# Patient Record
Sex: MALE | Race: WHITE | NOT HISPANIC OR LATINO | Employment: OTHER | URBAN - METROPOLITAN AREA
[De-identification: names, ages, dates, MRNs, and addresses within clinical notes are randomized per-mention and may not be internally consistent; named-entity substitution may affect disease eponyms.]

---

## 2024-01-23 PROBLEM — F32.A DEPRESSION: Status: ACTIVE | Noted: 2024-01-23

## 2024-01-23 PROBLEM — E78.9 ABNORMAL CHOLESTEROL TEST: Status: ACTIVE | Noted: 2024-01-23

## 2024-01-23 PROBLEM — G20.A2 PARKINSON'S DISEASE WITHOUT DYSKINESIA, WITH FLUCTUATING MANIFESTATIONS (MULTI): Status: ACTIVE | Noted: 2024-01-23

## 2024-01-23 PROBLEM — I10 HYPERTENSION: Status: ACTIVE | Noted: 2024-01-23

## 2024-01-23 PROBLEM — R55 SYNCOPE: Status: ACTIVE | Noted: 2024-01-23

## 2024-01-23 RX ORDER — ACETAMINOPHEN 500 MG
1 TABLET ORAL DAILY
COMMUNITY
Start: 2020-04-26 | End: 2024-01-24 | Stop reason: WASHOUT

## 2024-01-23 RX ORDER — POLYETHYLENE GLYCOL 3350 17 G/17G
17 POWDER, FOR SOLUTION ORAL 2 TIMES DAILY
COMMUNITY
Start: 2015-08-27 | End: 2024-01-24 | Stop reason: WASHOUT

## 2024-01-23 RX ORDER — LOSARTAN POTASSIUM AND HYDROCHLOROTHIAZIDE 12.5; 5 MG/1; MG/1
1 TABLET ORAL DAILY
COMMUNITY
Start: 2021-01-19 | End: 2024-01-24 | Stop reason: SINTOL

## 2024-01-23 RX ORDER — FLUOXETINE HYDROCHLORIDE 20 MG/1
1 CAPSULE ORAL
COMMUNITY
Start: 2021-08-24 | End: 2024-01-24 | Stop reason: WASHOUT

## 2024-01-23 RX ORDER — FOLIC ACID 1 MG/1
1 TABLET ORAL
COMMUNITY
Start: 2015-08-27 | End: 2024-01-24 | Stop reason: WASHOUT

## 2024-01-23 RX ORDER — LANOLIN ALCOHOL/MO/W.PET/CERES
100 CREAM (GRAM) TOPICAL
COMMUNITY
Start: 2015-08-27 | End: 2024-01-24 | Stop reason: ALTCHOICE

## 2024-01-23 NOTE — PROGRESS NOTES
Subjective   Reason for Visit: Hakan Mccurdy is an 73 y.o. male here for a Medicare Wellness visit.               HPI  The patient presented in the office today to reestablish me as his PCP.  He returned from [facility] 3 months ago.  While there, he was diagnosed with Parkinson's disease.  Over the past 2 years, he has noted continued chronic poor balance, constant shuffling gait.  Also, he reports a history of a constant tremor in the right hand x 2 years.  He reports that the amplitude of the tremor increases when he is under stress.  He still reports continued chronic kinetic and mild postural tremor in the left hand-not significantly changed over the past 2 years.    He does report a history of intermittent episodes of choking times a few years.  He reports that the episodes only occur when he tries to swallow larger pieces of food while eating quickly.  He reports no other associated symptoms.    Over the past 2 years, the patient reports nocturia x 7-8, increased urinary frequency, urinary urgency, weaker urine stream, more frequent interruption of urine stream, and increase in postvoid dribbling.  He reports no other associated symptoms.  Since discontinuing losartan over a year ago, he reports no episodes of stiffness throughout the fingers of both hands    He also discontinued rosuvastatin a few years ago secondary to the development of constant diffuse arthralgias and myalgias.  Also, he has not been using carbidopa levodopa because he was experiencing freezing episodes every few hours while on the medication regimen.  He reports no other new complaints.  Patient Care Team:  Richard Wong MD as PCP - General (Internal Medicine)     Review of Systems  All systems have been reviewed and are normal except as previously noted  Objective   Vitals:  There were no vitals taken for this visit.      Physical Exam  Head-palpation revealed no tenderness over the maxillary or frontal sinuses  Eyes-extraocular  movements intact pupils -unequal-left pupil dilated; fundi revealed good retinal color, no hemorrhages or exudates  Ears-palpation of pinnas and traguses revealed no tenderness. External auditory canals not erythematous or swollen.  Left TM clear; right TM not visualized secondary to impacted cerumen  Nose-turbinates not erythematous or swollen no septal deviation noted  Mouth-posterior pharynx is not erythematous or swollen. Tonsillar pillars appeared normal no exudates  Neck no lymphadenopathy. Thyroid gland not enlarged. , No bruits  Lungs-clear to auscultation bilaterally  Cardiac-rate normal rhythm regular no murmurs no JVD  Abdomen-soft, slightly distended, normal active bowel sounds. Palpation revealed no tenderness or masses  Pulses 2+ bilaterally  Extremities-no peripheral edema      Skin-Diffuse xerosis noted-especially over the lower legs bilaterally.  Multiple cherry red spots noted on the back. There is a 2 x 1 cm brown scaly plaque located in the right upper back region.    Neurologic  Mental status-alert and oriented x3   Cranial nerves--2 through 12 grossly intact,; masked facies noted no visual field abnormalities  Motor-no pronator drift noted, strength-5/5 in all muscle groups tested, , resting tremor noted right upper extremity, mild kinetic and postural tremor noted left upper extremity.  Mild bradykinesia noted.  Rigidity noted in all 4 extremities lower extremities greater than upper extremities.  Positive pull test  Sensory-Light touch sensation fully intact  Pinprick sensation fully intact  Vibratory sensation diminished in the lower extremities bilaterally right greater than left and in the upper extremities bilaterally right greater than left  Cerebellar-no truncal ataxia, good coordination finger-nose testing,, good coordination heel-to-shin testing, normal rapid alternating movements  Positive Romberg, patient unable to perform tandem gait  Reflexes-upper extremities 2+/4 bilaterally,  lower extremities 0/4 bilaterally  Assessment/Plan   Problem List Items Addressed This Visit    None       Assessment  Left anterior hemiblock  Hypertension-blood pressure at goal today  Asthma  COVID-19 summer 2022  Chronic intermittent episodes of nasal congestion, rhinorrhea, postnasal drip-likely secondary to vasomotor rhinitis. Allergic rhinitis may be a contributing factor  Multiple food allergies  Chronic frequency, chronic nocturiax6-7,, chronic weak stream, chronic hesitancy, chronic intermittentinterruption of stream, chronic post void dribbling-likely secondary to BPH  Colonic polyps  Diverticulosis  Vitamin D deficiency  Syncopal episode June 2016-etiology not elicited  Chronic poor balance-secondary to Parkinson's disease  Chronic  shuffling gait-may be secondary to Parkinson's disease.  Chronic resting tremor right hand-likely secondary to Parkinson's disease  Chronic mild postural and kinetic tremor left hand-May be secondary to essential tremor  Small vessel ischemic changes  Parkinson's disease  Depression-status post ECT treatment 2015  Anxiety  Obsessive-compulsive disorder  Personality disorder    Plan  Obtain urinalysis, CBC differential, CMP, fasting lipid profile, TSH, vitamin D level, vitamin B12 level, cardiac CRP today.  Refer to new neurologist in [facility]  I told the patient to begin application of Lubriderm over the entire body twice daily  More than likely, I will be starting the patient on Flomax 0.4 mg p.o. every evening  The patient will return for a follow-up visit in 6 months

## 2024-01-24 ENCOUNTER — LAB (OUTPATIENT)
Dept: LAB | Facility: LAB | Age: 74
End: 2024-01-24
Payer: MEDICARE

## 2024-01-24 ENCOUNTER — OFFICE VISIT (OUTPATIENT)
Dept: PRIMARY CARE | Facility: CLINIC | Age: 74
End: 2024-01-24
Payer: MEDICARE

## 2024-01-24 ENCOUNTER — TELEPHONE (OUTPATIENT)
Dept: PRIMARY CARE | Facility: CLINIC | Age: 74
End: 2024-01-24

## 2024-01-24 VITALS
DIASTOLIC BLOOD PRESSURE: 70 MMHG | SYSTOLIC BLOOD PRESSURE: 100 MMHG | HEART RATE: 100 BPM | BODY MASS INDEX: 29.58 KG/M2 | WEIGHT: 183.2 LBS

## 2024-01-24 DIAGNOSIS — G20.A2 PARKINSON'S DISEASE WITHOUT DYSKINESIA, WITH FLUCTUATING MANIFESTATIONS (MULTI): ICD-10-CM

## 2024-01-24 DIAGNOSIS — F41.9 ANXIETY DISORDER, UNSPECIFIED TYPE: ICD-10-CM

## 2024-01-24 DIAGNOSIS — Z00.00 ROUTINE GENERAL MEDICAL EXAMINATION AT A HEALTH CARE FACILITY: ICD-10-CM

## 2024-01-24 DIAGNOSIS — E78.9 ABNORMAL CHOLESTEROL TEST: ICD-10-CM

## 2024-01-24 DIAGNOSIS — N40.1 BENIGN LOCALIZED PROSTATIC HYPERPLASIA WITH LOWER URINARY TRACT SYMPTOMS (LUTS): ICD-10-CM

## 2024-01-24 DIAGNOSIS — R80.9 PROTEINURIA, UNSPECIFIED TYPE: ICD-10-CM

## 2024-01-24 DIAGNOSIS — E55.9 VITAMIN D DEFICIENCY: ICD-10-CM

## 2024-01-24 DIAGNOSIS — G20.A2 PARKINSON'S DISEASE WITHOUT DYSKINESIA, WITH FLUCTUATING MANIFESTATIONS (MULTI): Primary | ICD-10-CM

## 2024-01-24 DIAGNOSIS — N40.0 ENLARGED PROSTATE: ICD-10-CM

## 2024-01-24 LAB
25(OH)D3 SERPL-MCNC: 62 NG/ML (ref 30–100)
ALBUMIN SERPL BCP-MCNC: 4.1 G/DL (ref 3.4–5)
ALP SERPL-CCNC: 83 U/L (ref 33–136)
ALT SERPL W P-5'-P-CCNC: 14 U/L (ref 10–52)
ANION GAP SERPL CALC-SCNC: 11 MMOL/L (ref 10–20)
AST SERPL W P-5'-P-CCNC: 21 U/L (ref 9–39)
BASOPHILS # BLD AUTO: 0.04 X10*3/UL (ref 0–0.1)
BASOPHILS NFR BLD AUTO: 0.7 %
BILIRUB SERPL-MCNC: 0.7 MG/DL (ref 0–1.2)
BUN SERPL-MCNC: 21 MG/DL (ref 6–23)
CALCIUM SERPL-MCNC: 9.2 MG/DL (ref 8.6–10.6)
CHLORIDE SERPL-SCNC: 103 MMOL/L (ref 98–107)
CHOLEST SERPL-MCNC: 187 MG/DL (ref 0–199)
CHOLESTEROL/HDL RATIO: 3.5
CO2 SERPL-SCNC: 30 MMOL/L (ref 21–32)
CREAT SERPL-MCNC: 1.1 MG/DL (ref 0.5–1.3)
EGFRCR SERPLBLD CKD-EPI 2021: 71 ML/MIN/1.73M*2
EOSINOPHIL # BLD AUTO: 0.04 X10*3/UL (ref 0–0.4)
EOSINOPHIL NFR BLD AUTO: 0.7 %
ERYTHROCYTE [DISTWIDTH] IN BLOOD BY AUTOMATED COUNT: 12.3 % (ref 11.5–14.5)
GLUCOSE SERPL-MCNC: 96 MG/DL (ref 74–99)
HCT VFR BLD AUTO: 48 % (ref 41–52)
HDLC SERPL-MCNC: 53.2 MG/DL
HGB BLD-MCNC: 16.2 G/DL (ref 13.5–17.5)
IMM GRANULOCYTES # BLD AUTO: 0.03 X10*3/UL (ref 0–0.5)
IMM GRANULOCYTES NFR BLD AUTO: 0.5 % (ref 0–0.9)
LDLC SERPL CALC-MCNC: 117 MG/DL
LYMPHOCYTES # BLD AUTO: 1.01 X10*3/UL (ref 0.8–3)
LYMPHOCYTES NFR BLD AUTO: 16.4 %
MCH RBC QN AUTO: 32.5 PG (ref 26–34)
MCHC RBC AUTO-ENTMCNC: 33.8 G/DL (ref 32–36)
MCV RBC AUTO: 96 FL (ref 80–100)
MONOCYTES # BLD AUTO: 0.66 X10*3/UL (ref 0.05–0.8)
MONOCYTES NFR BLD AUTO: 10.7 %
NEUTROPHILS # BLD AUTO: 4.36 X10*3/UL (ref 1.6–5.5)
NEUTROPHILS NFR BLD AUTO: 71 %
NON HDL CHOLESTEROL: 134 MG/DL (ref 0–149)
NRBC BLD-RTO: 0 /100 WBCS (ref 0–0)
PLATELET # BLD AUTO: 213 X10*3/UL (ref 150–450)
POC APPEARANCE, URINE: CLEAR
POC BILIRUBIN, URINE: NEGATIVE
POC BLOOD, URINE: NEGATIVE
POC COLOR, URINE: YELLOW
POC GLUCOSE, URINE: NEGATIVE MG/DL
POC KETONES, URINE: ABNORMAL MG/DL
POC LEUKOCYTES, URINE: NEGATIVE
POC NITRITE,URINE: NEGATIVE
POC PH, URINE: 6 PH
POC PROTEIN, URINE: ABNORMAL MG/DL
POC SPECIFIC GRAVITY, URINE: 1.02
POC UROBILINOGEN, URINE: 1 EU/DL
POTASSIUM SERPL-SCNC: 4.1 MMOL/L (ref 3.5–5.3)
PROT SERPL-MCNC: 6.4 G/DL (ref 6.4–8.2)
PSA SERPL-MCNC: 1.73 NG/ML
RBC # BLD AUTO: 4.99 X10*6/UL (ref 4.5–5.9)
SODIUM SERPL-SCNC: 140 MMOL/L (ref 136–145)
TRIGL SERPL-MCNC: 83 MG/DL (ref 0–149)
TSH SERPL-ACNC: 1.18 MIU/L (ref 0.44–3.98)
VIT B12 SERPL-MCNC: 277 PG/ML (ref 211–911)
VLDL: 17 MG/DL (ref 0–40)
WBC # BLD AUTO: 6.1 X10*3/UL (ref 4.4–11.3)

## 2024-01-24 PROCEDURE — 99213 OFFICE O/P EST LOW 20 MIN: CPT | Performed by: INTERNAL MEDICINE

## 2024-01-24 PROCEDURE — G0439 PPPS, SUBSEQ VISIT: HCPCS | Performed by: INTERNAL MEDICINE

## 2024-01-24 PROCEDURE — 1170F FXNL STATUS ASSESSED: CPT | Performed by: INTERNAL MEDICINE

## 2024-01-24 PROCEDURE — 84156 ASSAY OF PROTEIN URINE: CPT

## 2024-01-24 PROCEDURE — 3074F SYST BP LT 130 MM HG: CPT | Performed by: INTERNAL MEDICINE

## 2024-01-24 PROCEDURE — 80053 COMPREHEN METABOLIC PANEL: CPT

## 2024-01-24 PROCEDURE — 82607 VITAMIN B-12: CPT

## 2024-01-24 PROCEDURE — 36415 COLL VENOUS BLD VENIPUNCTURE: CPT

## 2024-01-24 PROCEDURE — 84443 ASSAY THYROID STIM HORMONE: CPT

## 2024-01-24 PROCEDURE — 82306 VITAMIN D 25 HYDROXY: CPT

## 2024-01-24 PROCEDURE — 3078F DIAST BP <80 MM HG: CPT | Performed by: INTERNAL MEDICINE

## 2024-01-24 PROCEDURE — 80061 LIPID PANEL: CPT

## 2024-01-24 PROCEDURE — 85025 COMPLETE CBC W/AUTO DIFF WBC: CPT

## 2024-01-24 PROCEDURE — 84153 ASSAY OF PSA TOTAL: CPT

## 2024-01-24 PROCEDURE — 81002 URINALYSIS NONAUTO W/O SCOPE: CPT | Performed by: INTERNAL MEDICINE

## 2024-01-24 PROCEDURE — 82570 ASSAY OF URINE CREATININE: CPT

## 2024-01-24 PROCEDURE — 1157F ADVNC CARE PLAN IN RCRD: CPT | Performed by: INTERNAL MEDICINE

## 2024-01-24 RX ORDER — CHOLECALCIFEROL (VITAMIN D3) 50 MCG
50 TABLET ORAL DAILY
COMMUNITY

## 2024-01-24 ASSESSMENT — ENCOUNTER SYMPTOMS
DEPRESSION: 0
LOSS OF SENSATION IN FEET: 0
OCCASIONAL FEELINGS OF UNSTEADINESS: 1

## 2024-01-24 ASSESSMENT — ACTIVITIES OF DAILY LIVING (ADL)
MANAGING_FINANCES: INDEPENDENT
DOING_HOUSEWORK: INDEPENDENT
TAKING_MEDICATION: INDEPENDENT
BATHING: INDEPENDENT
GROCERY_SHOPPING: INDEPENDENT
DRESSING: INDEPENDENT

## 2024-01-24 NOTE — TELEPHONE ENCOUNTER
He was supposed to give you the name of Dr. Blayne Jimenez and you were going to give him his phone number

## 2024-01-25 LAB
CREAT UR-MCNC: 241.9 MG/DL (ref 20–370)
PROT UR-ACNC: 53 MG/DL (ref 5–25)
PROT/CREAT UR: 0.22 MG/MG CREAT (ref 0–0.17)

## 2024-01-26 LAB — CREAT UR-MCNC: 247.5 MG/DL (ref 20–370)

## 2024-01-31 NOTE — PROGRESS NOTES
Subjective   Patient ID: Hakan Mccurdy is a 73 y.o. male who presents for follow-up visit    HPI   The patient presented to the office today for discussion of lab work.  The patient's 10-year ASCVD risk is 16.2%.  As a result, I recommended that he undergo a CT calcium score test which I have ordered.  He will have this test done in the near future.  The rest of the patient's lab work was excellent.  I recommended that he schedule a follow-up visit with me in late July 2024  Review of Systems    Objective   There were no vitals taken for this visit.    Physical Exam    Assessment/Plan

## 2024-02-01 ENCOUNTER — OFFICE VISIT (OUTPATIENT)
Dept: PRIMARY CARE | Facility: CLINIC | Age: 74
End: 2024-02-01
Payer: MEDICARE

## 2024-02-01 VITALS — WEIGHT: 181 LBS | BODY MASS INDEX: 29.23 KG/M2

## 2024-02-01 DIAGNOSIS — I10 PRIMARY HYPERTENSION: Primary | ICD-10-CM

## 2024-02-01 DIAGNOSIS — E78.9 ABNORMAL CHOLESTEROL TEST: ICD-10-CM

## 2024-02-01 PROCEDURE — 1157F ADVNC CARE PLAN IN RCRD: CPT | Performed by: INTERNAL MEDICINE

## 2024-02-01 PROCEDURE — 99212 OFFICE O/P EST SF 10 MIN: CPT | Performed by: INTERNAL MEDICINE

## 2024-02-01 PROCEDURE — 1160F RVW MEDS BY RX/DR IN RCRD: CPT | Performed by: INTERNAL MEDICINE

## 2024-02-01 PROCEDURE — 1159F MED LIST DOCD IN RCRD: CPT | Performed by: INTERNAL MEDICINE

## 2024-02-14 ENCOUNTER — APPOINTMENT (OUTPATIENT)
Dept: PRIMARY CARE | Facility: CLINIC | Age: 74
End: 2024-02-14
Payer: MEDICARE

## 2024-03-12 RX ORDER — BISACODYL 5 MG
TABLET, DELAYED RELEASE (ENTERIC COATED) ORAL
COMMUNITY
Start: 2021-02-23

## 2024-03-12 RX ORDER — FLUTICASONE PROPIONATE 50 MCG
SPRAY, SUSPENSION (ML) NASAL
COMMUNITY
Start: 2018-03-06

## 2024-03-12 RX ORDER — DOXYCYCLINE HYCLATE 100 MG
TABLET ORAL EVERY 12 HOURS
COMMUNITY
Start: 2018-03-06

## 2024-03-12 RX ORDER — FLUOXETINE HYDROCHLORIDE 20 MG/1
1 CAPSULE ORAL DAILY
COMMUNITY
Start: 2016-07-13

## 2024-03-12 RX ORDER — ROSUVASTATIN CALCIUM 5 MG/1
5 TABLET, COATED ORAL DAILY
COMMUNITY
Start: 2023-09-16

## 2024-03-12 RX ORDER — MULTIVITAMIN WITH IRON
TABLET ORAL
COMMUNITY
Start: 2015-09-27

## 2024-03-12 RX ORDER — LISINOPRIL AND HYDROCHLOROTHIAZIDE 10; 12.5 MG/1; MG/1
TABLET ORAL
COMMUNITY
Start: 2021-01-05

## 2024-03-12 RX ORDER — ALBUTEROL SULFATE 90 UG/1
AEROSOL, METERED RESPIRATORY (INHALATION)
COMMUNITY
Start: 2018-03-06

## 2024-03-12 RX ORDER — GUAIFENESIN AND DEXTROMETHORPHAN HYDROBROMIDE 1200; 60 MG/1; MG/1
TABLET, EXTENDED RELEASE ORAL
COMMUNITY
Start: 2018-03-06

## 2024-03-12 RX ORDER — CARBIDOPA AND LEVODOPA 25; 100 MG/1; MG/1
1 TABLET ORAL 3 TIMES DAILY
COMMUNITY
Start: 2023-09-20 | End: 2024-03-13

## 2024-03-13 ENCOUNTER — OFFICE VISIT (OUTPATIENT)
Dept: NEUROLOGY | Facility: CLINIC | Age: 74
End: 2024-03-13
Payer: MEDICARE

## 2024-03-13 VITALS
DIASTOLIC BLOOD PRESSURE: 91 MMHG | WEIGHT: 181 LBS | HEART RATE: 111 BPM | RESPIRATION RATE: 18 BRPM | BODY MASS INDEX: 29.23 KG/M2 | SYSTOLIC BLOOD PRESSURE: 126 MMHG

## 2024-03-13 DIAGNOSIS — Z13.6 ENCOUNTER FOR SCREENING FOR CARDIOVASCULAR DISORDERS: ICD-10-CM

## 2024-03-13 DIAGNOSIS — G20.A1 PARKINSON'S DISEASE WITHOUT DYSKINESIA OR FLUCTUATING MANIFESTATIONS (MULTI): Primary | ICD-10-CM

## 2024-03-13 DIAGNOSIS — R41.3 MEMORY LOSS: ICD-10-CM

## 2024-03-13 PROCEDURE — 3074F SYST BP LT 130 MM HG: CPT | Performed by: PSYCHIATRY & NEUROLOGY

## 2024-03-13 PROCEDURE — 99205 OFFICE O/P NEW HI 60 MIN: CPT | Performed by: PSYCHIATRY & NEUROLOGY

## 2024-03-13 PROCEDURE — 1159F MED LIST DOCD IN RCRD: CPT | Performed by: PSYCHIATRY & NEUROLOGY

## 2024-03-13 PROCEDURE — 1160F RVW MEDS BY RX/DR IN RCRD: CPT | Performed by: PSYCHIATRY & NEUROLOGY

## 2024-03-13 PROCEDURE — 1157F ADVNC CARE PLAN IN RCRD: CPT | Performed by: PSYCHIATRY & NEUROLOGY

## 2024-03-13 PROCEDURE — 3079F DIAST BP 80-89 MM HG: CPT | Performed by: PSYCHIATRY & NEUROLOGY

## 2024-03-13 PROCEDURE — 1036F TOBACCO NON-USER: CPT | Performed by: PSYCHIATRY & NEUROLOGY

## 2024-03-13 RX ORDER — RIVASTIGMINE TARTRATE 1.5 MG/1
1.5 CAPSULE ORAL 2 TIMES DAILY
Qty: 28 CAPSULE | Refills: 0 | Status: SHIPPED | OUTPATIENT
Start: 2024-03-13 | End: 2024-03-27

## 2024-03-13 RX ORDER — RIVASTIGMINE TARTRATE 3 MG/1
3 CAPSULE ORAL 2 TIMES DAILY
Qty: 60 CAPSULE | Refills: 6 | Status: SHIPPED | OUTPATIENT
Start: 2024-03-13 | End: 2025-03-13

## 2024-03-13 RX ORDER — LEVODOPA AND CARBIDOPA 95; 23.75 MG/1; MG/1
1 CAPSULE, EXTENDED RELEASE ORAL 3 TIMES DAILY
Qty: 90 CAPSULE | Refills: 5 | Status: SHIPPED | OUTPATIENT
Start: 2024-03-13

## 2024-03-13 ASSESSMENT — UNIFIED PARKINSONS DISEASE RATING SCALE (UPDRS)
RIGIDITY_RLE: 1
PRONATION_SUPINATION_LEFT: 2
TOTAL_SCORE: 47
FACIAL_EXPRESSION: 2
AMPLITUDE_LLE: 0
POSTURAL_TREMOR_LEFTHAND: 0
PRONATION_SUPINATION_RIGHT: 3
HANDMOVEMENTS_RIGHT: 3
POSTURAL_STABILITY: 2
CONSTANCY_TREMOR_ATREST: 2
AMPLITUDE_LUE: 0
LEG_AGILITY_RIGHT: 1
SPEECH: 2
RIGIDITY_RUE: 1
HOEHN_YAHR: 2
RIGIDITY_LLE: 1
RIGIDITY_LUE: 1
FINGER_TAPPING_LEFT: 2
AMPLITUDE_RUE: 3
KINETIC_TREMOR_RIGHTHAND: 0
POSTURE: 2
AMPLITUDE_LIP_JAW: 0
RIGIDITY_NECK: 2
FREEZING_GAIT: 1
KINETIC_TREMOR_LEFTHAND: 0
SPONTANEITY_OF_MOVEMENT: 2
AMPLITUDE_RLE: 0
DYSKINESIAS_PRESENT: NO
FINGER_TAPPING_RIGHT: 3
TOETAPPING_RIGHT: 3
GAIT: 2
LEG_AGILITY_LEFT: 1
POSTURAL_TREMOR_RIGHTHAND: 0
TOETAPPING_LEFT: 2
CHAIR_RISING_SCALE: 1

## 2024-03-13 ASSESSMENT — PATIENT HEALTH QUESTIONNAIRE - PHQ9
1. LITTLE INTEREST OR PLEASURE IN DOING THINGS: NOT AT ALL
SUM OF ALL RESPONSES TO PHQ9 QUESTIONS 1 AND 2: 0
2. FEELING DOWN, DEPRESSED OR HOPELESS: NOT AT ALL

## 2024-03-13 ASSESSMENT — ENCOUNTER SYMPTOMS
DEPRESSION: 0
LOSS OF SENSATION IN FEET: 0
OCCASIONAL FEELINGS OF UNSTEADINESS: 1

## 2024-03-13 NOTE — PATIENT INSTRUCTIONS
"It was a pleasure seeing you today for Parkinson's disease with peak dose fatigue.  Try Rytary.    Please make a follow up appointment at the  or by calling the office in 4 months.     For any urgent issues or questions call [phone], option for doctor's , during our office hours Monday-Friday 8am-4:30pm, and leave a voicemail with your concern.  My office will try to reach back you as soon as possible within 24 (business) hours.  If you have an emergency please call 911 or visit a local urgent care or nearest emergency room.      Please understand that [employer] is a useful communication tool for simple \"normal\" results or a refill request but I would not recommend using this tool for emergent or urgent issues.  I am happy to ask my staff to arrange a follow up visit or a virtual visit sooner than requested with myself or Jackson (Nurse Practitioner), if appropriate for your care.    "

## 2024-03-13 NOTE — LETTER
"March 13, 2024     Richard Wong MD  [address] [facility] [address]    Patient: Hakan Mccurdy   YOB: 1950   Date of Visit: 3/13/2024       Dear Dr. Richard Wong MD:    Thank you for referring Hakan Mccurdy to me for evaluation. Below are my notes for this consultation.  If you have questions, please do not hesitate to call me. I look forward to following your patient along with you.       Sincerely,     Blayne Jimenez MD      CC: No Recipients  ______________________________________________________________________________________    [facility]     Subjective     Hakan Mccurdy is a  73 y.o. year old male who presents with Parkinson's disease   Visit type: new patient visit     HPI    He was living in [place], [place] for 2 years and was diagnosed with Parkinson's disease.  He was seen by Dr. Baez General Neurologist [phone] fax [phone].   Carbidopa/levodopa 25/100 worked for an hour, then plateau for an hour, then \"crash\" for an hour and became fatigued when he \"crashed.\"  He would fall asleep at those time, abruptly.  carbidopa/levodopa 25/100 1 TID.  He denies any other side effects, and it did seem to help before he had to stop it.  He has not been on any medications since he stopped levodopa.    Dr. Baez did check an MRI of the brain, but we do not have a report.  He thinks it was unremarkable, but he does not recall for sure.    He reports some short-term memory problems for a number of months, but long-term memory is fine.  He denies any difficulty with driving.    He has rest tremor, poor balance for 3 years.  No cane.  No physical therapy.    He is in independent living.  They do not provide transportation, but they do provide meals.        Patient Health Questionnaire-2 Score: 0                  Onset of parkinsonism: 2-3 years ago          Diagnosis of Parkinson's disease: 2 years ago    MOTOR SYMPTOMS:  Balance difficulty:  yes  Falls:  " almost  Exercise/PT:  no  Help with ADLs:  yes    Atypical features: no     NON MOTOR SYMPTOMS  Orthostatic lightheadedness:  no  Depression: no  Anxiety: no  Insomnia: no only occasionally  Dysphagia: no  Constipation: no  REM sleep behavioral disorder: no  Memory loss: no     PMH: BPH, htn, diverticulosis      Patient Active Problem List   Diagnosis   • Abnormal cholesterol test   • Constipation   • Depression   • Diverticulosis   • Early satiety   • Enlarged prostate   • Hypertension   • Recurrent major depression in partial remission (CMS/HCC)   • Syncope   • Parkinson's disease without dyskinesia, with fluctuating manifestations      Past Medical History:   Diagnosis Date   • Personal history of other diseases of male genital organs 10/08/2021    H/O prostatic hyperplasia   • Personal history of other diseases of the respiratory system 10/08/2021    History of asthma      Past Surgical History:   Procedure Laterality Date   • MR HEAD ANGIO WO IV CONTRAST  6/20/2015    MR HEAD ANGIO WO IV CONTRAST 6/20/2015 [facility]ICAL [facility]GAC[facility]   • MR NECK ANGIO WO IV CONTRAST  6/20/2015    MR NECK ANGIO WO IV CONTRAST 6/20/2015 [facility] CLINICAL LEGACY      Social History     Socioeconomic History   • Marital status: Single     Spouse name: Not on file   • Number of children: Not on file   • Years of education: Not on file   • Highest education level: Not on file   Occupational History   • Not on file   Tobacco Use   • Smoking status: Never   • Smokeless tobacco: Never   Substance and Sexual Activity   • Alcohol use: Never   • Drug use: Not on file   • Sexual activity: Not on file   Other Topics Concern   • Not on file   Social History Narrative   • Not on file     Social Determinants of Health     Financial Resource Strain: Not on file   Food Insecurity: Not on file   Transportation Needs: Not on file   Physical Activity: Not on file   Stress: Not on file   Social Connections: Not on file   Intimate Partner Violence: Not on file    Housing Stability: Not on file      Family History   Problem Relation Name Age of Onset   • Hypertension Mother     • Other (Type 2 diabetes mellitus) Mother     • Other (Alzheimer's dementia) Mother     • Other (Peripheral neuropathy) Father     • Ulcerative colitis Sister        Patient Health Questionnaire-2 Score: 0      Visit Vitals  BP (!) 126/91 (BP Location: Left arm, Patient Position: Standing, BP Cuff Size: Large adult)   Pulse (!) 111   Resp 18   Wt 82.1 kg (181 lb)   BMI 29.23 kg/m²   Smoking Status Never   BSA 1.96 m²        Review of Systems  All other system have been reviewed and are negative for complaint.  Objective   Neurological Exam    GENERAL APPEARANCE:  No distress, alert and cooperative.     CARDIOVASCULAR: Regular radial a pulses    MENTAL STATE:  Orientation was normal to time, place and person.  Language testing was normal for comprehension, repetition, expression, and naming. Serial 7s intact.  MoCA 19/30, he remembered 0 items on STM.    OPHTHALMOSCOPIC: Deferred due to COVID-19    CRANIAL NERVES:  Cranial nerves were normal.      CN 2- visual fields full to confrontation.      CN 3, 4, 6-  full range of movement, no nystagmus     CN 5- Facial sensation intact bilaterally.      CN 7- Normal and symmetric facial strength. Nasolabial folds symmetric.     CN 8- Hearing intact to finger rub, whisper.      CN 9- Palate elevates symmetrically. Normal gag reflex.      CN 11- Normal strength of shoulder shrug and neck turning      CN 12- Tongue midline, with normal bulk and strength; no fasciculations.     MOTOR:  Motor exam was normal. Muscle bulk were normal in both upper and lower extremities. Muscle strength was 5/5 in distal and proximal muscles in both upper and lower extremities. No fasciculations, tremor or other abnormal movements were present.     REFLEXES:  Right/ Left:  Biceps 2/2, brachioradialis 2/2, triceps 2/2, patellar 1/1, ankle 1/1  Babinski: toes downgoing to plantar  stimulation. No clonus, frontal release signs or other pathologic reflexes present.     SENSORY: Sensory exam was intact to light touch,  vibration and temperature in both UE and LE.     COORDINATION: RFNF intact    GAIT: Slightly wide, shuffling, reduced arm swing    MDS UPDRS 1st Score: Motor Examination  Speech: 2  Facial Expression: 2  Rigidty Neck: 2  Rigidty RUE: 1  Rigidity - LUE: 1  Rigidity RLE: 1  Rigidity LLE: 1  Finger Tapping Right Hand: 3  Finger Tapping Left Hand: 2  Hand Movements- Right Hand: 3  Hand Movements- Left Hand: 2  Pronatiaon-Supination Movments - Right Hand: 3  Pronatiaon-Supination Movments Left Hand: 2  Toe Tapping Right Foot: 3  Toe Tapping - Left Foot: 2  Leg Agility - Right Le  Leg Agility - Left le  Arising from Chair: 1  Gait: 2  Freezing of Gait: 1  Postural Stability: 2  Posture: 2  Global Spontanteity of Movment ( Body Bradykinesia): 2  Postural Tremor - Right Hand: 0  Postural Tremor - Left hand: 0  Kinetic Tremor - Right hand: 0  Kinetic Tremor - Left hand: 0  Rest Tremor Amplitude - RUE: 3  Rest Tremor Amplitude - LUE: 0  Rest Tremor Amplitude - RLE: 0  Rest Tremor Amplitude - LLE: 0  Rest Tremor Amplitude - Lip/Jaw: 0  Constancy of Rest Tremor: 2  MDS UPDRS Total Score: 47  Were dyskinesias (chorea or dystonia) present during examination?: No  Hoen and Yahr Stage: 2               Thyroid Stimulating Hormone   Date Value Ref Range Status   2024 1.18 0.44 - 3.98 mIU/L Final     B12 was low-normal at 277      Assessment/Plan       Hakan Mccurdy is a 73 y.o. year old male here for probable Parkinson's disease, by [employer] brain bank criteria.  He has some postural instability, and tends to stumble, so I discussed with him using a cane and having physical therapy.  He is not on treatment, and clearly seems to warrant treatment.  I discussed with him to possible strategies.  1 would be Rytary, which may not cause peak dose fatigue as to the same degree as levodopa.  The  other would be a half tab 3 times daily of levodopa with entacapone.  Because of the peak dose phenomenon, Rytary would be a better strategy.  I went ahead and prescribed it, after discussion.  I will prescribe physical and Occupational Therapy.  He has interest in volunteering in our saliva biomarker study and also our genetic testing study.  I gave him information about Parkinson's disease.  Driving eval due to MoCA of 19.  We will recheck B12 with metabolites and start rivastigmine in the meantime.  We will see him back in about 4 months.

## 2024-03-18 ENCOUNTER — TELEPHONE (OUTPATIENT)
Dept: NEUROLOGY | Facility: CLINIC | Age: 74
End: 2024-03-18
Payer: MEDICARE

## 2024-03-22 ENCOUNTER — HOSPITAL ENCOUNTER (OUTPATIENT)
Dept: RADIOLOGY | Facility: HOSPITAL | Age: 74
Discharge: HOME | End: 2024-03-22
Payer: MEDICARE

## 2024-03-22 DIAGNOSIS — E78.9 ABNORMAL CHOLESTEROL TEST: ICD-10-CM

## 2024-03-22 PROCEDURE — 75571 CT HRT W/O DYE W/CA TEST: CPT

## 2024-03-27 ENCOUNTER — EVALUATION (OUTPATIENT)
Dept: PHYSICAL THERAPY | Facility: CLINIC | Age: 74
End: 2024-03-27
Payer: MEDICARE

## 2024-03-27 VITALS — DIASTOLIC BLOOD PRESSURE: 91 MMHG | SYSTOLIC BLOOD PRESSURE: 149 MMHG | HEART RATE: 89 BPM

## 2024-03-27 DIAGNOSIS — G20.A1 PARKINSON'S DISEASE WITHOUT DYSKINESIA OR FLUCTUATING MANIFESTATIONS (MULTI): ICD-10-CM

## 2024-03-27 DIAGNOSIS — G20.A2 PARKINSON'S DISEASE WITHOUT DYSKINESIA, WITH FLUCTUATING MANIFESTATIONS (MULTI): ICD-10-CM

## 2024-03-27 DIAGNOSIS — R26.81 UNSTEADINESS ON FEET: Primary | ICD-10-CM

## 2024-03-27 PROBLEM — Z91.81 AT RISK FOR FALLS: Status: ACTIVE | Noted: 2024-03-27

## 2024-03-27 PROCEDURE — 97161 PT EVAL LOW COMPLEX 20 MIN: CPT | Mod: GP | Performed by: PHYSICAL THERAPIST

## 2024-03-27 PROCEDURE — 97112 NEUROMUSCULAR REEDUCATION: CPT | Mod: GP | Performed by: PHYSICAL THERAPIST

## 2024-03-27 ASSESSMENT — ENCOUNTER SYMPTOMS
DEPRESSION: 0
LOSS OF SENSATION IN FEET: 0
OCCASIONAL FEELINGS OF UNSTEADINESS: 1

## 2024-03-27 ASSESSMENT — PAIN SCALES - GENERAL: PAINLEVEL_OUTOF10: 0 - NO PAIN

## 2024-03-27 ASSESSMENT — PAIN - FUNCTIONAL ASSESSMENT: PAIN_FUNCTIONAL_ASSESSMENT: 0-10

## 2024-03-27 NOTE — PROGRESS NOTES
Physical Therapy    Physical Therapy Evaluation and Treatment      Patient Name: Hakan Mccurdy  MRN: [id number]  Today's Date: 3/27/2024  POC end date: 6/25/24  Insurance: Medicare  Visit: 1   PT Evaluation Time Entry  PT Evaluation (Low) Time Entry: 35  PT Therapeutic Procedures Time Entry  Neuromuscular Re-Education Time Entry: 25  Self-Care/Home Mgmt Training: 15    Assessment:  Patient is a 73 year old male referred to [facility]'s outpatient physical therapy services with a chief complaint of decreased balance and unsteady gait. The patient has a medical diagnosis of Parkinson's Disease with symptoms starting 2.5-3 years ago. Pt presents with decreased LE strength, R worse than L, decreased balance, unsteadiness on feet, and tremor affecting R side more than L. The patient scored am 8/30 on the FGA which is below the age related norm and also below the cut-off score that puts the patient at an increased risk for falls and hospitalization. The patient also demonstrated decreased gait speed with the TUG and the 10MWT and scored below the age related norms demonstrating an increased risk for falls. The patient will benefit from skilled PT services to address balance and gait deficits and reduce his risk for falls and injury due to falls.          Plan:  OP PT Plan  Treatment/Interventions: Cryotherapy, Education/ Instruction, Gait training, Hot pack, Neuromuscular re-education, Self care/ home management, Therapeutic activities, Therapeutic exercises  PT Plan: Skilled PT  PT Frequency: 2 times per week  Duration: 10 visits  Onset Date: 03/13/24  Certification Period Start Date: 03/27/24  Certification Period End Date: 06/25/24  Number of Treatments Authorized: 20  Rehab Potential: Fair  Plan of Care Agreement: Patient    Current Problem:   1. Unsteadiness on feet  Follow Up In Physical Therapy      2. Parkinson's disease without dyskinesia or fluctuating manifestations  Referral to Physical Therapy     "Follow Up In Physical Therapy      3. Parkinson's disease without dyskinesia, with fluctuating manifestations  Follow Up In Physical Therapy          Subjective    General:  Patient Report:  Pt. Was diagnosed with Parkinson's Disease 1.5 yrs ago. He started noticing symptoms 2.5-3 yrs ago. His walking was unstable and his balance was getting worse. Pt started taking Parkinson's medications, but received no other treatments or physical therapy for PD. He is currently taking Rivastigmine and Rytary 3x/day and timing the medication around his meals. Pt reports that he had 1 fall 1.5 yrs ago walking in the parking lot outside trying to get to the car and wasn't able to reach anything. He bruised his ribs from this fall but no other injuries. Pt says that he always holds on to furniture or nearby object when ambulating and has trouble in the parking lot when there is nothing to hold on to. The patient steady's himself on the cash of cars in the parking lot. 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 He has 2 cousins in [place], can see and talk to them but that's about it. 2 sisters in [place] who he no longer has a relationship with. Tingling in hands for about 2 years that comes and goes a couple times a month.     CLOF: Pt is able to do mostly everything on his own. Independent living facility prepares meals, but the pt is doing his ADLs on his own.    Home Setup: independent living, senior apartment. Elevator at his apartment. Grab bars throughout hallways and his apartment.  Equipment: Has rollator, not currently using it. His living facility has a gym with aerobic equipment.  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 Goal: \"less shaking\"     HPI:   Blayne Jimenez MD  Office visit 3/13/24    \"Hakan Mccurdy is " "a  73 y.o. year old male who presents with Parkinson's disease   Visit type: new patient visit     He was living in [place], [place] for 2 years and was diagnosed with Parkinson's disease.  He was seen by Dr. Baez General Neurologist [phone] fax [phone].   Carbidopa/levodopa 25/100 worked for an hour, then plateau for an hour, then \"crash\" for an hour and became fatigued when he \"crashed.\"  He would fall asleep at those time, abruptly.  carbidopa/levodopa 25/100 1 TID.  He denies any other side effects, and it did seem to help before he had to stop it.  He has not been on any medications since he stopped levodopa.     Dr. Baez did check an MRI of the brain, but we do not have a report.  He thinks it was unremarkable, but he does not recall for sure.     He reports some short-term memory problems for a number of months, but long-term memory is fine.  He denies any difficulty with driving.     He has rest tremor, poor balance for 3 years.  No cane.  No physical therapy.     He is in independent living.  They do not provide transportation, but they do provide meals.\"    \"Hakan Mccurdy is a 73 y.o. year old male here for probable Parkinson's disease, by [facility] brain bank criteria.  He has some postural instability, and tends to stumble, so I discussed with him using a cane and having physical therapy.  He is not on treatment, and clearly seems to warrant treatment.  I discussed with him to possible strategies.  1 would be Rytary, which may not cause peak dose fatigue as to the same degree as levodopa.  The other would be a half tab 3 times daily of levodopa with entacapone.  Because of the peak dose phenomenon, Rytary would be a better strategy.  I went ahead and prescribed it, after discussion.  I will prescribe physical and Occupational Therapy.  He has interest in volunteering in our saliva biomarker study and also our genetic testing study.  I gave him information about Parkinson's disease.  Driving eval due to " "MoCA of 19.  We will recheck B12 with metabolites and start rivastigmine in the meantime.  We will see him back in about 4 months.\"       Precautions: HTN     Vital Signs: 149/91 LUE electronic cuff     Pain: 0/10         Objective        Cognition: hx. Dec. STM per last neurology note.  observation= Ambulated into clinic with no device  Posture= decreased lumbar lordosis, stooped posture  Tremor= RUE and RLE  Coordination= finger opposition R decreased speed more than L, pronation/supination R decreased speed and mod dysmetria, finger to nose L R tremor present with moderately reduced speed on R, plantarflexion/dorsiflexion decreased speed RLE   Vision= wears glasses. Bifocals  Hearing= WNL  Gait= Decreased arm swing B, shuffling of feet, forward trunk lean, difficulty with turns     Lower Extremity Strength MMT (tested in seated)    Left Right   Hip flexion 5/5 4-/5   Hip ADD  5/5 5/5   Hip ABD 5/5 5/5   Knee flexion 4+/5 3+/5   Knee Extension 5/5 4+/5   Ankle Dorsiflexion 4-/5 3+/5   Ankle Plantarflexion 5/5 5/5   Ankle Inversion 5/5 5/5   Ankle Eversion 5/5 5/5      Outcome Measures:     TUG= 16.7 seconds with no device  FGA=7/30  30 second sit to stand= 6 reps with 1 UE support on first rep, no Ue support on other 5  10MWT normal=24.2 seconds with no device  10MWT \"fast\"=16 seconds with no device SBA         *further outcome measures including 6MWT and ABC will be performed in next 1-2 visits    Treatments:    Neuro Re-Ed:  Parkinson specific education pt on benefits of aerobic exercise, 3x/week building up to 30 minutes each for a neuro-protective effect, to reduce disease progression and motor severity for Parkinson's Disease  Educated pt. On medications that are utilized for PD, discussed that PT will focus on balance/gait impairments and medication/DBS is what are utilized for managing tremors    Self-Care/Home Mgmt Training: (billed with Neuro Re-Ed)   Educated pt on and provided handout for treatment " "options for tremor including weighted utensils and pens     EDUCATION: see flowsheet/treatment       Goals:  Active       PT Problem       PT Goal 1       Start:  03/27/24    Expected End:  06/25/24       Pt. Will improve 30 second sit to stand by 2 repetitions to meet the minimally clinical important improvement.         PT Goal 2       Start:  03/27/24    Expected End:  06/25/24       Pt will improve timed up and go time by 4.85 seconds to meet the meaningful detectable change in patients with Parkinson's Disease and decrease risk of falls.         PT Goal 3       Start:  03/27/24    Expected End:  06/25/24       Pt will improve FGA score by 5 points to decrease his risk for falls and meet the meaningful detectable change value.         PT Goal 4       Start:  03/27/24    Expected End:  06/25/24       Pt will improve 10MWT by .13 m/s in order to meet the substantial meaningful change value and decrease risk for falls.         PT Goal 5       Start:  03/27/24    Expected End:  06/25/24       Pt will perform the ABC and 6MWT in the next 1-2 visits for further assessment of gait speed and endurance.         Patient Stated Goal 1       Start:  03/27/24    Expected End:  06/25/24       Pt stated goal: \"less shaking\"                   "

## 2024-04-03 ENCOUNTER — APPOINTMENT (OUTPATIENT)
Dept: PHYSICAL THERAPY | Facility: CLINIC | Age: 74
End: 2024-04-03
Payer: MEDICARE

## 2024-04-04 ENCOUNTER — APPOINTMENT (OUTPATIENT)
Dept: PHYSICAL THERAPY | Facility: CLINIC | Age: 74
End: 2024-04-04
Payer: MEDICARE

## 2024-04-05 ENCOUNTER — DOCUMENTATION (OUTPATIENT)
Dept: PHYSICAL THERAPY | Facility: CLINIC | Age: 74
End: 2024-04-05
Payer: MEDICARE

## 2024-04-05 NOTE — PROGRESS NOTES
Patient no showed to appointed scheduled on 4/5/24 at 1:45pm. PT called and left a message explaining no show/cancellation policy and reminded patient of their next scheduled appointment.

## 2024-04-12 ENCOUNTER — TREATMENT (OUTPATIENT)
Dept: PHYSICAL THERAPY | Facility: CLINIC | Age: 74
End: 2024-04-12
Payer: MEDICARE

## 2024-04-12 VITALS — SYSTOLIC BLOOD PRESSURE: 135 MMHG | HEART RATE: 98 BPM | DIASTOLIC BLOOD PRESSURE: 87 MMHG

## 2024-04-12 DIAGNOSIS — G20.A1 PARKINSON'S DISEASE WITHOUT DYSKINESIA OR FLUCTUATING MANIFESTATIONS (MULTI): ICD-10-CM

## 2024-04-12 DIAGNOSIS — G20.A2 PARKINSON'S DISEASE WITHOUT DYSKINESIA, WITH FLUCTUATING MANIFESTATIONS (MULTI): ICD-10-CM

## 2024-04-12 DIAGNOSIS — R26.81 UNSTEADINESS ON FEET: Primary | ICD-10-CM

## 2024-04-12 PROCEDURE — 97116 GAIT TRAINING THERAPY: CPT | Mod: GP | Performed by: PHYSICAL THERAPIST

## 2024-04-12 PROCEDURE — 97530 THERAPEUTIC ACTIVITIES: CPT | Mod: GP | Performed by: PHYSICAL THERAPIST

## 2024-04-12 ASSESSMENT — PAIN SCALES - GENERAL: PAINLEVEL_OUTOF10: 0 - NO PAIN

## 2024-04-12 ASSESSMENT — PAIN - FUNCTIONAL ASSESSMENT: PAIN_FUNCTIONAL_ASSESSMENT: 0-10

## 2024-04-12 NOTE — PROGRESS NOTES
Physical Therapy    Physical Therapy Treatment    Patient Name: Hakan Mccurdy  MRN: [id number]  Today's Date: 4/12/2024  Time Calculation  Start Time: 1427  Stop Time: 1515  Time Calculation (min): 48 min     PT Therapeutic Procedures Time Entry  Gait Training Time Entry: 36  Therapeutic Activity Time Entry: 12     Assessment:  Pt. Completed 6MWT today with no device ambulating at speed of .58 m/s, this speed places patient at risk for falls, hospitalizations and adverse events compared to cut off score of .7m/s.  Time was spent performing gait training with hurrycane. Pt. Demonstrated increased postural stability and increased stride length with hurrycane vs. No device.  Pt. Required verbal cues for sequencing curb with cane  however pt. Improved to modified independence with practice.     Plan: home program       Current Problem  1. Unsteadiness on feet  Follow Up In Physical Therapy      2. Parkinson's disease without dyskinesia or fluctuating manifestations (Multi)  Follow Up In Physical Therapy      3. Parkinson's disease without dyskinesia, with fluctuating manifestations (Multi)  Follow Up In Physical Therapy                Subjective    Pt. Reports that he is also planning on going to Occupational therapy so he got confused on if he should come to physical therapy.  He has started taking Rytary and this has been working well, he has been taking about 2 weeks.      Precautions  Precautions  Precautions Comment: HTN  Vital Signs  Vital Signs  Heart Rate: 98  BP: 135/87  BP Location: Left arm  BP Method: Automatic  Patient Position: Sitting  Pain  Pain Assessment  Pain Assessment: 0-10  Pain Score: 0 - No pain    Objective   Cognition: hx. Dec. STM           Outcome Measures:    Treatments:  There Act:  6MWT with no device  Sit<>stands= With no UE support with PT demo and VC for ant. WS   Large amplitude stepping forward  x10 on R side with PT demo with no UE support  Provided handout for sit<>Stands    Gait  "training:  Functional mobility with hurrycane in RUE 60'x1  Functional mobility with hurrycane in LUE 60'x1  Functional mobility with trekking pole 60'x1 in RUE  Functional mobility with trekking pole 60'x1 in LUE   PT demo initially on sequencing with cane  Curb simulation with hurrycane x4 laps up and over with SBA (no VC after some practice)  Stair negotiation 3-6 inch steps x4 laps with reciprocal pattern and unilateral UE support with VC initially to keep entire foot on step      OP EDUCATION:    Educated pt. On utilizing hurrycane outdoors and to get to dining hammond to improve safety/decrease fall risk  HEP:  Sit<>stands       Goals:  Active       PT Problem       PT Goal 1       Start:  03/27/24    Expected End:  06/25/24       Pt. Will improve 30 second sit to stand by 2 repetitions to meet the minimally clinical important improvement.         PT Goal 2       Start:  03/27/24    Expected End:  06/25/24       Pt will improve timed up and go time by 4.85 seconds to meet the meaningful detectable change in patients with Parkinson's Disease and decrease risk of falls.         PT Goal 3       Start:  03/27/24    Expected End:  06/25/24       Pt will improve FGA score by 5 points to decrease his risk for falls and meet the meaningful detectable change value.         PT Goal 4       Start:  03/27/24    Expected End:  06/25/24       Pt will improve 10MWT by .13 m/s in order to meet the substantial meaningful change value and decrease risk for falls.         PT Goal 5       Start:  03/27/24    Expected End:  06/25/24       Pt will perform the ABC and 6MWT in the next 1-2 visits for further assessment of gait speed and endurance.         Patient Stated Goal 1       Start:  03/27/24    Expected End:  06/25/24       Pt stated goal: \"less shaking\"           "

## 2024-04-15 ENCOUNTER — TREATMENT (OUTPATIENT)
Dept: PHYSICAL THERAPY | Facility: CLINIC | Age: 74
End: 2024-04-15
Payer: MEDICARE

## 2024-04-15 DIAGNOSIS — G20.A2 PARKINSON'S DISEASE WITHOUT DYSKINESIA, WITH FLUCTUATING MANIFESTATIONS (MULTI): ICD-10-CM

## 2024-04-15 DIAGNOSIS — R26.81 UNSTEADINESS ON FEET: ICD-10-CM

## 2024-04-15 DIAGNOSIS — G20.A1 PARKINSON'S DISEASE WITHOUT DYSKINESIA OR FLUCTUATING MANIFESTATIONS (MULTI): ICD-10-CM

## 2024-04-15 PROCEDURE — 97116 GAIT TRAINING THERAPY: CPT | Mod: GP | Performed by: PHYSICAL THERAPIST

## 2024-04-15 PROCEDURE — 97112 NEUROMUSCULAR REEDUCATION: CPT | Mod: GP | Performed by: PHYSICAL THERAPIST

## 2024-04-15 PROCEDURE — 97110 THERAPEUTIC EXERCISES: CPT | Mod: GP | Performed by: PHYSICAL THERAPIST

## 2024-04-15 ASSESSMENT — PAIN - FUNCTIONAL ASSESSMENT: PAIN_FUNCTIONAL_ASSESSMENT: 0-10

## 2024-04-15 ASSESSMENT — PAIN SCALES - GENERAL: PAINLEVEL_OUTOF10: 0 - NO PAIN

## 2024-04-15 NOTE — PROGRESS NOTES
Physical Therapy    Physical Therapy Treatment    Patient Name: Hakan Mccurdy  MRN: [id number]  Today's Date: 4/15/2024  Visit Number: 3  Medicare  POC end date: 6/25/24  Time Calculation  Start Time: 1020  Stop Time: 1100  Time Calculation (min): 40 min     PT Therapeutic Procedures Time Entry  Neuromuscular Re-Education Time Entry: 21  Therapeutic Exercise Time Entry: 9  Gait Training Time Entry: 10     Assessment:  Pt. arrived today with hurrycane. Pt. Demonstrated improved stride length and safety with utilization of hurrycane vs no device.  PT. Demonstrated difficulty with descending curb requiring VC to decrease pace.  Pt required unilateral UE support during forward large amplitude exercises due to postural instability. However, pt. Completed lateral and backwards stepping without upper extremity support safely.  Pt. Was initially challenged with proper anterior WS on foam with multiple LOB posteriorly into mat table, however pt. Improved significantly with practice.     Plan: obstacle course, stair negotiation, curbs, balance on uneven surfaces       Current Problem  1. Parkinson's disease without dyskinesia or fluctuating manifestations (Multi)  Follow Up In Physical Therapy      2. Parkinson's disease without dyskinesia, with fluctuating manifestations (Multi)  Follow Up In Physical Therapy      3. Unsteadiness on feet  Follow Up In Physical Therapy                Subjective    Pt. Reports that he got a hurrycane from the drug store.  No pain, no falls     Precautions  Precautions  Precautions Comment: HTN  Vital Signs     Pain  Pain Assessment  Pain Assessment: 0-10  Pain Score: 0 - No pain    Objective   Cognition: hx. Dec. STM           Pt. Arrived to visit with hurrycane at mod-I level    Treatments:    There Ex:  Recumbent cycle bike BLE's only L2.5, 8'x1, seat 6, to improve lower extremity strength and functional mobility endurance, VC to keep RPM's above 55 (RPE=15)    Gait training:  Stair  negotiation 3-6 inch steps x4 laps with reciprocal pattern and unilateral UE support with VC to keep entire foot on stair when ascending and wide JASKARAN to descend with SBA  Turn negotiation 180 turns x3 to the L and R with no device with SBA  Ascend/descend curb with hurrycane at SBA, VC to dec. Speed and lead with cane to ascend and descend   Functional mobility from gym to pt. Vehicle at mod-I level with hurrycane    Neuro Re-Ed:  Large amplitude stepping Forward x10 each side with unilateral UE support  Large amplitude stepping lateral x10 each side with no UE support   Large amplitude stepping backwards x10 on each side with no use of Ue's  Sit<>stands with FA on foam with SBA next to patient to PT mirroring patient x10      OP EDUCATION:    Educated pt. On utilizing hurrycane outdoors and to get to dining hammond to improve safety/decrease fall risk  HEP:  Sit<>stands  Large amplitude forward, side, backwards stepping         Goals:  Active       PT Problem       PT Goal 1       Start:  03/27/24    Expected End:  06/25/24       Pt. Will improve 30 second sit to stand by 2 repetitions to meet the minimally clinical important improvement.         PT Goal 2       Start:  03/27/24    Expected End:  06/25/24       Pt will improve timed up and go time by 4.85 seconds to meet the meaningful detectable change in patients with Parkinson's Disease and decrease risk of falls.         PT Goal 3       Start:  03/27/24    Expected End:  06/25/24       Pt will improve FGA score by 5 points to decrease his risk for falls and meet the meaningful detectable change value.         PT Goal 4       Start:  03/27/24    Expected End:  06/25/24       Pt will improve 10MWT by .13 m/s in order to meet the substantial meaningful change value and decrease risk for falls.         PT Goal 5       Start:  03/27/24    Expected End:  06/25/24       Pt will perform the ABC and 6MWT in the next 1-2 visits for further assessment of gait speed and  "endurance.         Patient Stated Goal 1       Start:  03/27/24    Expected End:  06/25/24       Pt stated goal: \"less shaking\"             "

## 2024-04-18 ENCOUNTER — TREATMENT (OUTPATIENT)
Dept: PHYSICAL THERAPY | Facility: CLINIC | Age: 74
End: 2024-04-18
Payer: MEDICARE

## 2024-04-18 DIAGNOSIS — R26.81 UNSTEADINESS ON FEET: Primary | ICD-10-CM

## 2024-04-18 DIAGNOSIS — G20.A1 PARKINSON'S DISEASE WITHOUT DYSKINESIA OR FLUCTUATING MANIFESTATIONS (MULTI): ICD-10-CM

## 2024-04-18 DIAGNOSIS — G20.A2 PARKINSON'S DISEASE WITHOUT DYSKINESIA, WITH FLUCTUATING MANIFESTATIONS (MULTI): ICD-10-CM

## 2024-04-18 PROCEDURE — 97112 NEUROMUSCULAR REEDUCATION: CPT | Mod: GP | Performed by: PHYSICAL THERAPIST

## 2024-04-18 PROCEDURE — 97116 GAIT TRAINING THERAPY: CPT | Mod: GP | Performed by: PHYSICAL THERAPIST

## 2024-04-18 PROCEDURE — 97110 THERAPEUTIC EXERCISES: CPT | Mod: GP | Performed by: PHYSICAL THERAPIST

## 2024-04-18 ASSESSMENT — PAIN - FUNCTIONAL ASSESSMENT: PAIN_FUNCTIONAL_ASSESSMENT: 0-10

## 2024-04-18 ASSESSMENT — PAIN SCALES - GENERAL: PAINLEVEL_OUTOF10: 0 - NO PAIN

## 2024-04-18 NOTE — PROGRESS NOTES
Physical Therapy    Physical Therapy Treatment    Patient Name: Hakan Mccurdy  MRN: [id number]  Today's Date: 4/18/2024  Visit Number: 4  Medicare  POC end date: 6/25/24  Time Calculation  Start Time: 1030  Stop Time: 1115  Time Calculation (min): 45 min     PT Therapeutic Procedures Time Entry  Neuromuscular Re-Education Time Entry: 20  Therapeutic Exercise Time Entry: 10  Gait Training Time Entry: 15     Assessment:  Pt demonstrated improved performance of balance exercises today. Pt able to perform large amplitude stepping exercises over SPC today and with no UE assist to complete lateral and backwards stepping. The patient was challenged with forward rock and reach, but improved throughout session. VC were given for large amplitude arm movements and for upright posture. The patient performed sit to stands on foam today with only minor cueing for anterior weight shift once standing. The patient had 1 LOB posteriorly that improved after shifting weight anteriorly. Functional mobility performed with hurrycane on uneven surfaces. VC for leading with cane stepping onto and off of surfaces and keeping cane closer. Pt reaches cane forward causing increased forward trunk lean.     Plan: obstacle course, stair negotiation, curbs, balance on uneven surfaces       Current Problem  1. Unsteadiness on feet  Follow Up In Physical Therapy      2. Parkinson's disease without dyskinesia or fluctuating manifestations (Multi)  Follow Up In Physical Therapy      3. Parkinson's disease without dyskinesia, with fluctuating manifestations (Multi)  Follow Up In Physical Therapy                  Subjective    Pt. Reports that he is getting better using his cane. He feels that it is helping a lot outside in parking lots.     Precautions  Precautions  Precautions Comment: HTN  Vital Signs     Pain  Pain Assessment  Pain Assessment: 0-10  Pain Score: 0 - No pain    Objective   Cognition: hx. Dec. [facility]           Pt. Arrived to visit with  "hurrycane at mod-I level    Treatments:    There Ex:  -Recumbent cycle bike BLE's only L2.5-3.5, 10'x1, seat 6, to improve lower extremity strength and functional mobility endurance, VC to keep RPM's above 60 (RPE=15)  -Educated patient on importance of keeping RPE 13-17 for moderate to high intensity exercise for neuroprotective effects    Gait training:  -Functional mobility with hurrycane on uneven surfaces (mat, 4\" curb step, and foam) x3 laps with CGA and VC to place whole foot on step, and keep moving feet to avoid FOG (1 LOB stepping onto mat with stepping strategy)  -Functional mobility from gym to pt. Vehicle at mod-I level with hurrycane    Neuro Re-Ed:  -Large amplitude stepping Forward over SPC x10 each side with unilateral UE support  -Large amplitude stepping lateral over SPC x10 each side with no UE support   -Large amplitude stepping backwards over SPC x10 on each side with no use of Ue's  -Forward rock and reach x10 each side with PT mirroring patient with VC for large amplitude arm movements  -Sit<>stands with FA on foam with SBA next to patient to PT mirroring patient x10      OP EDUCATION:    Educated pt. On utilizing hurrycane outdoors and to get to dining hammond to improve safety/decrease fall risk  HEP:  Sit<>stands  Large amplitude forward, side, backwards stepping         KEVIN Dejesus present and assisted with PT Evaluation & Treatment under the direct supervision of myself, the primary Physical Therapist.      Goals:  Active       PT Problem       PT Goal 1       Start:  03/27/24    Expected End:  06/25/24       Pt. Will improve 30 second sit to stand by 2 repetitions to meet the minimally clinical important improvement.         PT Goal 2       Start:  03/27/24    Expected End:  06/25/24       Pt will improve timed up and go time by 4.85 seconds to meet the meaningful detectable change in patients with Parkinson's Disease and decrease risk of falls.         PT Goal 3       Start:  " "03/27/24    Expected End:  06/25/24       Pt will improve FGA score by 5 points to decrease his risk for falls and meet the meaningful detectable change value.         PT Goal 4       Start:  03/27/24    Expected End:  06/25/24       Pt will improve 10MWT by .13 m/s in order to meet the substantial meaningful change value and decrease risk for falls.         PT Goal 5       Start:  03/27/24    Expected End:  06/25/24       Pt will perform the ABC and 6MWT in the next 1-2 visits for further assessment of gait speed and endurance.         Patient Stated Goal 1       Start:  03/27/24    Expected End:  06/25/24       Pt stated goal: \"less shaking\"               "

## 2024-04-22 ENCOUNTER — APPOINTMENT (OUTPATIENT)
Dept: PHYSICAL THERAPY | Facility: CLINIC | Age: 74
End: 2024-04-22
Payer: MEDICARE

## 2024-04-24 ENCOUNTER — TREATMENT (OUTPATIENT)
Dept: PHYSICAL THERAPY | Facility: CLINIC | Age: 74
End: 2024-04-24
Payer: MEDICARE

## 2024-04-24 DIAGNOSIS — G20.A1 PARKINSON'S DISEASE WITHOUT DYSKINESIA OR FLUCTUATING MANIFESTATIONS (MULTI): ICD-10-CM

## 2024-04-24 DIAGNOSIS — R26.81 UNSTEADINESS ON FEET: Primary | ICD-10-CM

## 2024-04-24 DIAGNOSIS — G20.A2 PARKINSON'S DISEASE WITHOUT DYSKINESIA, WITH FLUCTUATING MANIFESTATIONS (MULTI): ICD-10-CM

## 2024-04-24 PROCEDURE — 97110 THERAPEUTIC EXERCISES: CPT | Mod: GP | Performed by: PHYSICAL THERAPIST

## 2024-04-24 PROCEDURE — 97112 NEUROMUSCULAR REEDUCATION: CPT | Mod: GP | Performed by: PHYSICAL THERAPIST

## 2024-04-24 ASSESSMENT — PAIN SCALES - GENERAL: PAINLEVEL_OUTOF10: 0 - NO PAIN

## 2024-04-24 ASSESSMENT — PAIN - FUNCTIONAL ASSESSMENT: PAIN_FUNCTIONAL_ASSESSMENT: 0-10

## 2024-04-24 NOTE — PROGRESS NOTES
Physical Therapy    Physical Therapy Treatment    Patient Name: Hakan Mccurdy  MRN: [id number]  Today's Date: 4/25/2024  Visit Number: 5  Medicare  POC end date: 6/25/24  In time: 1016  Out time: 1100        PT Therapeutic Procedures Time Entry  Neuromuscular Re-Education Time Entry: 25  Therapeutic Exercise Time Entry: 9  Therapeutic Activity Time Entry: 9     Assessment:  Mr. Mccurdy progressed to completed all standing large amplitude exercises without a device today which is a good sign for improvement. Pt. Required CGA during exercises for safety.  Pt. Is starting to demonstrate positive stepping strategies during exercises, although pt. Required 2-3 steps at this time.  Pt. Practiced simulation of picking up objects from the ground with FA on foam, initially pt. With LOB posteriorly into mat table, however with practice the patient improved with his postural stability.  Overall, pt. Is making good progress with large amplitude exercises and shows potential to continue to improve to reach LTG.       Plan: obstacle course, stair negotiation, curbs, balance on uneven surfaces       Current Problem  1. Unsteadiness on feet  Follow Up In Physical Therapy      2. Parkinson's disease without dyskinesia or fluctuating manifestations (Multi)  Follow Up In Physical Therapy      3. Parkinson's disease without dyskinesia, with fluctuating manifestations (Multi)  Follow Up In Physical Therapy                  Subjective    Pt. Reports that he is doing his exercises every day.  The ryanrycane is working out very well for him.  He got compression socks, but hasn't used this yet.  He got a walker just in case, but hasn't used it yet.  He has a hard time picking up objects off of the ground and reaching to get things that are overhead    Precautions: N/A     Vital Signs: Not taken     Pain: N/A       Objective   Cognition: hx. Dec. [facility]           Pt. Arrived to visit with rick at mod-I level    Treatments:    There  Ex:  -Recumbent cycle bike BLE's only L2.5-3.5, 9'x1, seat 6, to improve lower extremity strength and functional mobility endurance, VC to keep RPM's above 60 (RPE=15)    There Act:  Pt donned bilateral compression socks at max-A  Pt. Donned/doffed shoes at mod-I level  Educated pt. In having someone at the assisted living facility donning his socks every morning and having pt. Remove the socks at night.    Neuro Re-Ed:  -Large amplitude stepping Forward over SPC x10 each side   -Large amplitude stepping lateral over SPC x10 each side with no UE support   -Large amplitude stepping backwards over SPC x10 on each side with no UE support  -Forward rock and reach x10 each side with PT mirroring patient with VC for large amplitude arm movements with unilateral UE support  -Sit<>stands with FA on foam with SBA x10 with LOB x1 posteriorly into mat table   -cone reaches with FA on foam picking up cones on ground and placing them on mat table behind patient x5 on each side x2 sets with CGA, LOB x1 posteriorly with positive stepping strategy noted       OP EDUCATION:    Educated pt. On utilizing hurrycane outdoors and to get to dining hammond to improve safety/decrease fall risk  HEP:  Sit<>stands  Large amplitude forward, side, backwards stepping         KEVIN Dejesus present and assisted with PT Evaluation & Treatment under the direct supervision of myself, the primary Physical Therapist.      Goals:  Active       PT Problem       PT Goal 1       Start:  03/27/24    Expected End:  06/25/24       Pt. Will improve 30 second sit to stand by 2 repetitions to meet the minimally clinical important improvement.         PT Goal 2       Start:  03/27/24    Expected End:  06/25/24       Pt will improve timed up and go time by 4.85 seconds to meet the meaningful detectable change in patients with Parkinson's Disease and decrease risk of falls.         PT Goal 3       Start:  03/27/24    Expected End:  06/25/24       Pt will improve  "FGA score by 5 points to decrease his risk for falls and meet the meaningful detectable change value.         PT Goal 4       Start:  03/27/24    Expected End:  06/25/24       Pt will improve 10MWT by .13 m/s in order to meet the substantial meaningful change value and decrease risk for falls.         PT Goal 5       Start:  03/27/24    Expected End:  06/25/24       Pt will perform the ABC and 6MWT in the next 1-2 visits for further assessment of gait speed and endurance.         Patient Stated Goal 1       Start:  03/27/24    Expected End:  06/25/24       Pt stated goal: \"less shaking\"                 "

## 2024-04-26 ENCOUNTER — TREATMENT (OUTPATIENT)
Dept: PHYSICAL THERAPY | Facility: CLINIC | Age: 74
End: 2024-04-26
Payer: MEDICARE

## 2024-04-26 DIAGNOSIS — G20.A2 PARKINSON'S DISEASE WITHOUT DYSKINESIA, WITH FLUCTUATING MANIFESTATIONS (MULTI): ICD-10-CM

## 2024-04-26 DIAGNOSIS — R26.81 UNSTEADINESS ON FEET: Primary | ICD-10-CM

## 2024-04-26 DIAGNOSIS — G20.A1 PARKINSON'S DISEASE WITHOUT DYSKINESIA OR FLUCTUATING MANIFESTATIONS (MULTI): ICD-10-CM

## 2024-04-26 PROCEDURE — 97530 THERAPEUTIC ACTIVITIES: CPT | Mod: GP | Performed by: PHYSICAL THERAPIST

## 2024-04-26 PROCEDURE — 97110 THERAPEUTIC EXERCISES: CPT | Mod: GP | Performed by: PHYSICAL THERAPIST

## 2024-04-26 ASSESSMENT — PAIN SCALES - GENERAL: PAINLEVEL_OUTOF10: 0 - NO PAIN

## 2024-04-26 ASSESSMENT — PAIN - FUNCTIONAL ASSESSMENT: PAIN_FUNCTIONAL_ASSESSMENT: 0-10

## 2024-04-26 NOTE — PROGRESS NOTES
Physical Therapy    Physical Therapy Treatment    Patient Name: Hakan Mccurdy  MRN: [id number]  Today's Date: 4/26/2024  Visit Number: 6  Medicare  POC end date: 6/25/24  In time: 1035  Out time: 1117  Time Calculation  Start Time: 1035  Stop Time: 1117  Time Calculation (min): 42 min     PT Therapeutic Procedures Time Entry  Neuromuscular Re-Education Time Entry: 32  Therapeutic Exercise Time Entry: 10     Assessment:  Patient completed large amplitude stepping over SBQC today with no LOB. Pt demonstrates proper weight shift, wide JASKARAN, and large steps to clear the cane. Pt initially challenged with sit to stands on foam with LOB posteriorly, however, improved with reps and cueing for reaching forward before standing. Pt performed sideways rock and reach with VC for wide JASKARAN, and full twist to tapping letters on the wall. Pt with occasional posterior LOB and lean on wall.     Plan: reaching overhead, obstacle course, stair negotiation, curbs, balance on uneven surfaces       Current Problem  1. Unsteadiness on feet  Follow Up In Physical Therapy      2. Parkinson's disease without dyskinesia or fluctuating manifestations (Multi)  Follow Up In Physical Therapy      3. Parkinson's disease without dyskinesia, with fluctuating manifestations (Multi)  Follow Up In Physical Therapy                    Subjective    Pt. Reports that he is doing his exercises every day.  He thinks the compression socks helped. He has not put them on since last session. He reports that he is having trouble reaching overhead and picking things up off the floor.     Precautions:   Precautions  Precautions Comment: HTN  Vital Signs: Not taken     Pain: N/A  Pain Assessment  Pain Assessment: 0-10  Pain Score: 0 - No pain    Objective   Cognition: hx. Dec. [facility]           Pt. Arrived to visit with rick at mod-I level    Treatments:    There Ex:  -Recumbent cycle bike BLE's only L2.5-3.5, 10'x1, seat 6, to improve lower extremity strength and  functional mobility endurance, VC to keep RPM's above 60 (RPE=15)    There Act: (combined with neuro re-ed for billing)  Pt donned bilateral compression socks at max-A  Pt. Donned/doffed shoes at mod-I level  Educated pt. In having someone at the assisted living facility donning his socks every morning and having pt. Remove the socks at night.    Neuro Re-Ed:  -Large amplitude stepping Forward over SBQC x10 each side   -Large amplitude stepping lateral over SBQC x10 each side with no UE support   -Large amplitude stepping backwards over SPC x10 on each side with no UE support  -Forward rock and reach x10 each side with PT mirroring patient with VC for large amplitude arm movements with unilateral UE support  -Sit<>stands with FA on foam with SBA x10 with VC to reach forward and keep weight on toes. LOB x2 posteriorly into mat table   -Sideways rock and reach in front of wall x10 each side reaching for letters on wall for VC for weight shifting and twisting all the way to the letters      OP EDUCATION:    Educated pt. On utilizing hursmartclipcane outdoors and to get to dining hammond to improve safety/decrease fall risk  HEP:  Sit<>stands  Large amplitude forward, side, backwards stepping         KEVIN Dejesus present and assisted with PT Evaluation & Treatment under the direct supervision of myself, the primary Physical Therapist.      Goals:  Active       PT Problem       PT Goal 1       Start:  03/27/24    Expected End:  06/25/24       Pt. Will improve 30 second sit to stand by 2 repetitions to meet the minimally clinical important improvement.         PT Goal 2       Start:  03/27/24    Expected End:  06/25/24       Pt will improve timed up and go time by 4.85 seconds to meet the meaningful detectable change in patients with Parkinson's Disease and decrease risk of falls.         PT Goal 3       Start:  03/27/24    Expected End:  06/25/24       Pt will improve FGA score by 5 points to decrease his risk for falls and  "meet the meaningful detectable change value.         PT Goal 4       Start:  03/27/24    Expected End:  06/25/24       Pt will improve 10MWT by .13 m/s in order to meet the substantial meaningful change value and decrease risk for falls.         PT Goal 5       Start:  03/27/24    Expected End:  06/25/24       Pt will perform the ABC and 6MWT in the next 1-2 visits for further assessment of gait speed and endurance.         Patient Stated Goal 1       Start:  03/27/24    Expected End:  06/25/24       Pt stated goal: \"less shaking\"                   "

## 2024-04-29 ENCOUNTER — TREATMENT (OUTPATIENT)
Dept: PHYSICAL THERAPY | Facility: CLINIC | Age: 74
End: 2024-04-29
Payer: MEDICARE

## 2024-04-29 DIAGNOSIS — R26.81 UNSTEADINESS ON FEET: Primary | ICD-10-CM

## 2024-04-29 DIAGNOSIS — G20.A1 PARKINSON'S DISEASE WITHOUT DYSKINESIA OR FLUCTUATING MANIFESTATIONS (MULTI): ICD-10-CM

## 2024-04-29 DIAGNOSIS — G20.A2 PARKINSON'S DISEASE WITHOUT DYSKINESIA, WITH FLUCTUATING MANIFESTATIONS (MULTI): ICD-10-CM

## 2024-04-29 PROCEDURE — 97112 NEUROMUSCULAR REEDUCATION: CPT | Mod: GP | Performed by: PHYSICAL THERAPIST

## 2024-04-29 PROCEDURE — 97110 THERAPEUTIC EXERCISES: CPT | Mod: GP | Performed by: PHYSICAL THERAPIST

## 2024-04-29 ASSESSMENT — PAIN - FUNCTIONAL ASSESSMENT: PAIN_FUNCTIONAL_ASSESSMENT: 0-10

## 2024-04-29 ASSESSMENT — PAIN SCALES - GENERAL: PAINLEVEL_OUTOF10: 0 - NO PAIN

## 2024-04-29 NOTE — PROGRESS NOTES
Physical Therapy    Physical Therapy Treatment    Patient Name: Hakan Mccurdy  MRN: [id number]  Today's Date: 4/29/2024  Visit Number: 7  Medicare  POC end date: 6/25/24  In time: 1035  Out time: 1117  Time Calculation  Start Time: 1000  Stop Time: 1055  Time Calculation (min): 55 min     PT Therapeutic Procedures Time Entry  Neuromuscular Re-Education Time Entry: 41  Therapeutic Exercise Time Entry: 14     Assessment:  Mr. Mccurdy is demonstrating progressively improved static and dynamic balance with excellent response to large amplitude based exercises.  The patient is challenged with balance on uneven surfaces such as foam, with tendency to demonstrate excessive postural sway leading to LOB forward.  The patient is demonstrating improved stepping strategies during losses of balance, however when pt. Is on uneven surfaces pt. Requires CGA for safety. Pt. Required VC's 25% of the time for proper color with dual cognitive task with blaze pods this date.  Pt. Benefited from PT counting during obstacle course to improve fluidity.     Plan: reaching overhead, obstacle course, stair negotiation, curbs, balance on uneven surfaces       Current Problem  1. Unsteadiness on feet  Follow Up In Physical Therapy      2. Parkinson's disease without dyskinesia or fluctuating manifestations (Multi)  Follow Up In Physical Therapy      3. Parkinson's disease without dyskinesia, with fluctuating manifestations (Multi)  Follow Up In Physical Therapy                    Subjective    Pt. Reports that he is doing his exercises every day.  He thinks the compression socks helped. He has not put them on since last session. He reports that he is having trouble reaching overhead and picking things up off the floor.     Precautions:   Precautions  Precautions Comment: HTN  Vital Signs: Not taken     Pain: N/A  Pain Assessment  Pain Assessment: 0-10  Pain Score: 0 - No pain    Objective   Cognition: hx. Dec. [facility]           Pt. Arrived to visit  with hurrycane at mod-I level    Treatments:    There Ex:  -Recumbent cycle bike BLE's only L3.5, 8'x1, seat 6, to improve lower extremity strength and functional mobility endurance, VC to keep RPM's above 70 (RPE=14-15 HR=) BM  Functional mobility from gym to pt. Vehicle, outdoors on uneven ramp, pavement with hurrycane at mod-I level  Ascend/descend curb 5-inch with hurrycane at supervision level, VC for leading with cane to ascend/descend, steady with no LOB    Neuro Re-Ed:  -Large amplitude stepping Forward over SBQC x10 each side   -Large amplitude stepping lateral over SBQC x10 each side with no UE support   -Large amplitude stepping backwards over SPC x10 on each side with no UE support  -Forward rock and reach x10 each side with PT mirroring patient with VC for large amplitude arm movements with unilateral UE support (multiple LOB with positive stepping strategies)  -Sit<>stands with FA on foam with lunge step over chayito with LOBx1 anteriorly with mod-A x1 (modified after this LOB)  -sit<>stands with FA on foam with tap-up onto step with PT calling out yellow/blue or number/letter for promoting SLS x10 with CGA  -Step up/down airex foam with 180 turn with CGA x8 reps total  -Sideways rock and reach in front of wall x10 each side reaching for letters on wall for VC for weight shifting and twisting all the way to the letters (multiple LOB with positive stepping strategy with mod VC for proper technique)  -blaze pods on mirror with FA on foam, emphasis on anterior WS on uneven surface, 2 colors, color catch, red=right, blue=left, 1' bouts x2   1st bout= 15 reps   2nd bout=18 reps   -obstacle course with x2 hurdles, XL airex and regular airex x4 cycles with PT counting 1-8 with CGA      OP EDUCATION:    Educated pt. On utilizing hurrycane outdoors and to get to dining hammond to improve safety/decrease fall risk  HEP:  Sit<>stands  Large amplitude forward, side, backwards stepping         Mar Lopez, SPT  "present and assisted with PT Evaluation & Treatment under the direct supervision of myself, the primary Physical Therapist.      Goals:  Active       PT Problem       PT Goal 1       Start:  03/27/24    Expected End:  06/25/24       Pt. Will improve 30 second sit to stand by 2 repetitions to meet the minimally clinical important improvement.         PT Goal 2       Start:  03/27/24    Expected End:  06/25/24       Pt will improve timed up and go time by 4.85 seconds to meet the meaningful detectable change in patients with Parkinson's Disease and decrease risk of falls.         PT Goal 3       Start:  03/27/24    Expected End:  06/25/24       Pt will improve FGA score by 5 points to decrease his risk for falls and meet the meaningful detectable change value.         PT Goal 4       Start:  03/27/24    Expected End:  06/25/24       Pt will improve 10MWT by .13 m/s in order to meet the substantial meaningful change value and decrease risk for falls.         PT Goal 5       Start:  03/27/24    Expected End:  06/25/24       Pt will perform the ABC and 6MWT in the next 1-2 visits for further assessment of gait speed and endurance.         Patient Stated Goal 1       Start:  03/27/24    Expected End:  06/25/24       Pt stated goal: \"less shaking\"                     "

## 2024-05-01 ENCOUNTER — TREATMENT (OUTPATIENT)
Dept: PHYSICAL THERAPY | Facility: CLINIC | Age: 74
End: 2024-05-01
Payer: MEDICARE

## 2024-05-01 DIAGNOSIS — R26.81 UNSTEADINESS ON FEET: Primary | ICD-10-CM

## 2024-05-01 DIAGNOSIS — G20.A1 PARKINSON'S DISEASE WITHOUT DYSKINESIA OR FLUCTUATING MANIFESTATIONS (MULTI): ICD-10-CM

## 2024-05-01 DIAGNOSIS — G20.A2 PARKINSON'S DISEASE WITHOUT DYSKINESIA, WITH FLUCTUATING MANIFESTATIONS (MULTI): ICD-10-CM

## 2024-05-01 PROCEDURE — 97112 NEUROMUSCULAR REEDUCATION: CPT | Mod: GP | Performed by: PHYSICAL THERAPIST

## 2024-05-01 PROCEDURE — 97110 THERAPEUTIC EXERCISES: CPT | Mod: GP | Performed by: PHYSICAL THERAPIST

## 2024-05-01 ASSESSMENT — PAIN SCALES - GENERAL: PAINLEVEL_OUTOF10: 0 - NO PAIN

## 2024-05-01 ASSESSMENT — PAIN - FUNCTIONAL ASSESSMENT: PAIN_FUNCTIONAL_ASSESSMENT: WONG-BAKER FACES

## 2024-05-01 NOTE — PROGRESS NOTES
Physical Therapy    Physical Therapy Treatment    Patient Name: Hakan Mccurdy  MRN: [id number]  Today's Date: 5/1/2024  Visit Number: 8  Medicare  POC end date: 6/25/24  Time Calculation  Start Time: 1012  Stop Time: 1107  Time Calculation (min): 55 min     PT Therapeutic Procedures Time Entry  Neuromuscular Re-Education Time Entry: 40  Therapeutic Exercise Time Entry: 15     Assessment:  Patient demonstrating improved balance with stepping exercises. Progressed to LBQC this session with one LOB stepping forward but able to maintain balance with stepping strategy. Patient tends to lose balance anteriorly due to forward posture and multiple VC given throughout session for upright posture. Pt with improved performance of sideways rock and reach this session with weight shifting and twisting all the way. Only 1 LOB anteriorly with positive stepping strategy. Pt practiced curb negotiation with VC 25% of the time for proper sequencing leading with cane.     Plan: reaching overhead, obstacle course, stair negotiation, curbs, balance on uneven surfaces       Current Problem  1. Unsteadiness on feet  Follow Up In Physical Therapy      2. Parkinson's disease without dyskinesia or fluctuating manifestations (Multi)  Follow Up In Physical Therapy      3. Parkinson's disease without dyskinesia, with fluctuating manifestations (Multi)  Follow Up In Physical Therapy                Subjective    Patient reports nothing new since last session. He feels that his balance is improving overall. He is most challenged with reaching overhead.    Precautions:   Precautions  Precautions Comment: HTN  Vital Signs: Not taken     Pain: N/A  Pain Assessment  Pain Assessment: Napier-Baker FACES  Pain Score: 0 - No pain    Objective   Cognition: hx. Dec. [facility]           Pt. Arrived to visit with rick at mod-I level    Treatments:    There Ex:  -Recumbent cycle bike BLE's only L3.5, 9'x1, seat 6, to improve lower extremity strength and functional  "mobility endurance, VC to keep RPM's above 70   Functional mobility from gym to pt. Vehicle, outdoors on uneven ramp, pavement with hurrycane at mod-I level  Ascend/descend curb 5-inch with hurrycane x8, VC 25% of the time for leading with cane to ascend/descend    Neuro Re-Ed:  -Large amplitude stepping Forward over LBQC x10 each side with CGA (1 LOB with stepping strategy to correct)  -Large amplitude stepping lateral over LBQC x10 each side with no UE support CGA  -Large amplitude stepping backwards over SPC x10 on each side with no UE support CGA  -Forward rock and reach x10 each side with PT mirroring patient with VC for large amplitude arm movements with unilateral UE support   -Sideways rock and reach in front of wall x10 each side reaching for letters on wall with SBA and VC for weight shifting and twisting all the way to the letters (x1 LOB with positive stepping strategy with mod VC for proper technique)  -sit<>stands with FA on foam with blaze pod tap on step \"color catch\" red=RLE blue=LLE 1'30\"x2 with CGA   1st bout: 5 reps   2nd bout: 7 reps  -Side stepping and tapping blaze pods x4 pods \"color catch\" red=RLE blue=LLE 1'30\"x2 with CGA   1st bout: 11 reps   2nd bout: 20 reps  -blaze pods on mirror, x2 pods high, x2 pods low, color catch, red=RUE, blue=LUE, 1'30\" bouts x2   1st bout= 30 reps   2nd bout=35 reps on foam      OP EDUCATION:    Educated pt. On utilizing hurrycane outdoors and to get to dining hammond to improve safety/decrease fall risk  HEP:  Sit<>stands  Large amplitude forward, side, backwards stepping         KEVIN Dejesus present and assisted with PT Evaluation & Treatment under the direct supervision of myself, the primary Physical Therapist.      Goals:  Active       PT Problem       PT Goal 1       Start:  03/27/24    Expected End:  06/25/24       Pt. Will improve 30 second sit to stand by 2 repetitions to meet the minimally clinical important improvement.         PT Goal 2       " "Start:  03/27/24    Expected End:  06/25/24       Pt will improve timed up and go time by 4.85 seconds to meet the meaningful detectable change in patients with Parkinson's Disease and decrease risk of falls.         PT Goal 3       Start:  03/27/24    Expected End:  06/25/24       Pt will improve FGA score by 5 points to decrease his risk for falls and meet the meaningful detectable change value.         PT Goal 4       Start:  03/27/24    Expected End:  06/25/24       Pt will improve 10MWT by .13 m/s in order to meet the substantial meaningful change value and decrease risk for falls.         PT Goal 5       Start:  03/27/24    Expected End:  06/25/24       Pt will perform the ABC and 6MWT in the next 1-2 visits for further assessment of gait speed and endurance.         Patient Stated Goal 1       Start:  03/27/24    Expected End:  06/25/24       Pt stated goal: \"less shaking\"                       "

## 2024-05-06 ENCOUNTER — APPOINTMENT (OUTPATIENT)
Dept: PHYSICAL THERAPY | Facility: CLINIC | Age: 74
End: 2024-05-06
Payer: MEDICARE

## 2024-05-08 ENCOUNTER — TREATMENT (OUTPATIENT)
Dept: PHYSICAL THERAPY | Facility: CLINIC | Age: 74
End: 2024-05-08
Payer: MEDICARE

## 2024-05-08 DIAGNOSIS — G20.A1 PARKINSON'S DISEASE WITHOUT DYSKINESIA OR FLUCTUATING MANIFESTATIONS (MULTI): ICD-10-CM

## 2024-05-08 DIAGNOSIS — R26.81 UNSTEADINESS ON FEET: Primary | ICD-10-CM

## 2024-05-08 DIAGNOSIS — G20.A2 PARKINSON'S DISEASE WITHOUT DYSKINESIA, WITH FLUCTUATING MANIFESTATIONS (MULTI): ICD-10-CM

## 2024-05-08 PROCEDURE — 97110 THERAPEUTIC EXERCISES: CPT | Mod: GP,KX | Performed by: PHYSICAL THERAPIST

## 2024-05-08 PROCEDURE — 97112 NEUROMUSCULAR REEDUCATION: CPT | Mod: GP,KX | Performed by: PHYSICAL THERAPIST

## 2024-05-08 NOTE — PROGRESS NOTES
Physical Therapy    Physical Therapy Treatment    Patient Name: Hakan Mccurdy  MRN: [id number]  Today's Date: 5/8/2024  Visit Number: 9  Medicare  POC end date: 6/25/24  Time Calculation  Start Time: 1016  Stop Time: 1100  Time Calculation (min): 44 min     PT Therapeutic Procedures Time Entry  Neuromuscular Re-Education Time Entry: 34  Therapeutic Exercise Time Entry: 10     Assessment:  Patient is demonstrating improved postural stability during functional mobility with increased step height on right side and reduced festinating gait.  The patient also demonstrated improved safety and stability with curb negotiation today with SPC and did not require verbal cues for proper sequencing.  Pt. Was challenged with progression to completing large amplitude exercises with alternating LE's to work on coordination of BLE's, however with practice the patient improved.     Plan: progress check in next 1-2 visits.       Current Problem  Primary dx.=unsteadiness on feet  1. Unsteadiness on feet  Follow Up In Physical Therapy      2. Parkinson's disease without dyskinesia or fluctuating manifestations (Multi)  Follow Up In Physical Therapy      3. Parkinson's disease without dyskinesia, with fluctuating manifestations (Multi)  Follow Up In Physical Therapy              Subjective    Patient reports that he feels like he is walking better and getting out of chairs better.  He would like to get a reacher to  objects off of the ground.    Precautions:   Precautions  Precautions Comment: HTN  Vital Signs: Not taken     Pain: N/A       Objective   Cognition: hx. Dec. [facility]           Pt. Arrived to visit with ryanhieulore at mod-I level    Treatments:    There Ex:  -Recumbent cycle bike BLE's only L3.5, 10'x1, seat 6, to improve lower extremity strength and functional mobility endurance, VC to keep RPM's above 75  Functional mobility from gym to pt. Vehicle, outdoors on uneven ramp, pavement with hurrycane at mod-I level  descend  "curb 5-inch with hurrycane x1    Neuro Re-Ed:  -Large amplitude stepping Forward over LBQC x10 each side with SBA, alt. LE's  -Large amplitude stepping lateral over LBQC x10 each side with no UE support CGA with 180' turn in between each repetition  -Large amplitude stepping backwards over SPC x10 on each side with no UE support CGA, alt. BLE's today  -Forward rock and reach x10 each side with PT mirroring patient with VC for large amplitude arm movements with unilateral UE support   -PWR! Posture, WS and twist in sequence with PT demo and VC x5 rounds  -sit<>stands with clock Jaco Solarsilf osorio 1'30\"x1 at 20SPM with PT demo initially improving with practice   -blaze pods placed on floor x2 speed 10' apart and x2 pods on mirror with emphasis on quick change of directions x2 rounds, color catch red=right, blue=left, 1'30\"x2 with SBA, VC initially to maintain BIG steps   1st round=14 reps  2nd round=17 reps       OP EDUCATION:    Sit<>stands  Large amplitude forward, side, backwards stepping       Goals:  Active       PT Problem       PT Goal 1       Start:  03/27/24    Expected End:  06/25/24       Pt. Will improve 30 second sit to stand by 2 repetitions to meet the minimally clinical important improvement.         PT Goal 2       Start:  03/27/24    Expected End:  06/25/24       Pt will improve timed up and go time by 4.85 seconds to meet the meaningful detectable change in patients with Parkinson's Disease and decrease risk of falls.         PT Goal 3       Start:  03/27/24    Expected End:  06/25/24       Pt will improve FGA score by 5 points to decrease his risk for falls and meet the meaningful detectable change value.         PT Goal 4       Start:  03/27/24    Expected End:  06/25/24       Pt will improve 10MWT by .13 m/s in order to meet the substantial meaningful change value and decrease risk for falls.         PT Goal 5       Start:  03/27/24    Expected End:  06/25/24       Pt will perform the ABC and 6MWT in " "the next 1-2 visits for further assessment of gait speed and endurance.         Patient Stated Goal 1       Start:  03/27/24    Expected End:  06/25/24       Pt stated goal: \"less shaking\"                         "

## 2024-05-10 ENCOUNTER — TREATMENT (OUTPATIENT)
Dept: PHYSICAL THERAPY | Facility: CLINIC | Age: 74
End: 2024-05-10
Payer: MEDICARE

## 2024-05-10 DIAGNOSIS — R26.81 UNSTEADINESS ON FEET: Primary | ICD-10-CM

## 2024-05-10 DIAGNOSIS — G20.A2 PARKINSON'S DISEASE WITHOUT DYSKINESIA, WITH FLUCTUATING MANIFESTATIONS (MULTI): ICD-10-CM

## 2024-05-10 DIAGNOSIS — G20.A1 PARKINSON'S DISEASE WITHOUT DYSKINESIA OR FLUCTUATING MANIFESTATIONS (MULTI): ICD-10-CM

## 2024-05-10 PROCEDURE — 97112 NEUROMUSCULAR REEDUCATION: CPT | Mod: GP,KX | Performed by: PHYSICAL THERAPIST

## 2024-05-10 PROCEDURE — 97530 THERAPEUTIC ACTIVITIES: CPT | Mod: GP,KX | Performed by: PHYSICAL THERAPIST

## 2024-05-10 NOTE — PROGRESS NOTES
Physical Therapy    Physical Therapy Treatment/progress check    Patient Name: Hakan Mccurdy  MRN: [id number]  Today's Date: 5/10/2024  Visit Number: 10  Medicare  POC end date: 6/25/24  Time Calculation  Start Time: 1030  Stop Time: 1128  Time Calculation (min): 58 min     PT Therapeutic Procedures Time Entry  Neuromuscular Re-Education Time Entry: 20  Therapeutic Activity Time Entry: 38     Assessment:  Progress check completed today to assess pt. Overall progress towards long term goals and determine need for continued physical therapy services.  Mr. Mccurdy has made excellent progress with interventions focused on large amplitude movements and improving balance strategies.  The patient improved in all functional outcome measures and met some of his already established long term goals.  The patient improved 30 second sit to stand test from 6 repetitions to 10 repetitions today, exceeding LTG and MCID of 2 repetitions.  The patient also improved his gait speed on both 10MWT and 6MWT.  Pt. Gait speed in 10MWT improving from .42 m/s to .63 m/s (0.21 m/s improvement) exceeding his long term goal for this test as well.  Good imrpovements were speed in FGA with score improving from 7/30 to 11/30 today.  Mr. Mccurdy shows potential to continue to improve his dynamic balance and functional mobility speed over longer distances.  Therefore, pt. Will benefit from continuing on current POC at 2/xweek frequency to decrease fall risk in  his home and the community.    Plan: obstacle courses stepping over obstacles, turn negotiation       Current Problem  Primary dx.=unsteadiness on feet  1. Unsteadiness on feet  Follow Up In Physical Therapy      2. Parkinson's disease without dyskinesia or fluctuating manifestations (Multi)  Follow Up In Physical Therapy      3. Parkinson's disease without dyskinesia, with fluctuating manifestations (Multi)  Follow Up In Physical Therapy                Subjective    Patient reports that he feels  "like he is walking better and getting out of chairs better.  He would like to get a reacher to  objects off of the ground.    Precautions:      Vital Signs: Not taken     Pain: N/A       Objective   Cognition: hx. Dec. STM  TUG=13.7 seconds with no device  5/10/24=  3/27/24= 16.7 seconds with no device    FGA=  5/10/24=11/30  3/27/24=7/30    30 second sit to stand=  5/10/24=10 reps with no UE support  3/27/24= 6 reps with 1 UE support on first rep, no Ue support on other 5    10MWT normal=  5/10/24=16 seconds with no device (.63 m/s)  3/27/24=24.2 seconds with no device (.42 m/s)    6MWT:  5/10/36=654 ft. With no device with SBA  4/12/24=680 ft. With no device with SBA           Pt. Arrived to visit with rick at mod-I level    Treatments:    There Act:  Functional mobility 30'x1 with no device  Functional mobility 20ft. With sit<>stand  6WMT with no device with SBA  Functional mobility outdoors with rick with VC for BIG steps, on uneven concrete with textured decline and inclines in pavement 200'x1  Functional mobility outdoors on threshold between dirt/grass and concrete x6 reps with VC to \"lift\" and bring cane up/down first  Provided pt. With progressive walking program starting at 4 minutes and building up to 10 minutes over 4 week period.    Neuro Re-Ed:  The following exercises were completed at supervision level over 20ft. distance  Gait level surface:  Change in gait speed:  Gait with horizontal head turns:  Gait with vertical head turns:  Gait with pivot turn:  Step over obstacle:  Gait with narrow JASKARAN:  Gait with eyes closed:  Ambulating backwards:  Stairs 3-6 inch stairs   Educated pt. On fall risk cut off score.       OP EDUCATION:    Sit<>stands  Large amplitude forward, side, backwards stepping  Daily walking program indoors       Goals:  Active       PT Problem       PT Goal 1 (Met)       Start:  03/27/24    Expected End:  06/25/24    Resolved:  05/10/24    Pt. Will improve 30 second " "sit to stand by 2 repetitions to meet the minimally clinical important improvement.         PT Goal 2 (Progressing)       Start:  03/27/24    Expected End:  06/25/24       Pt will improve timed up and go time by 4.85 seconds to meet the meaningful detectable change in patients with Parkinson's Disease and decrease risk of falls.      Goal Note       5/10/24=13.7 seconds              PT Goal 3 (Progressing)       Start:  03/27/24    Expected End:  06/25/24       Pt will improve FGA score by 5 points to decrease his risk for falls and meet the meaningful detectable change value.         PT Goal 4 (Met)       Start:  03/27/24    Expected End:  06/25/24    Resolved:  05/10/24    Pt will improve 10MWT by .13 m/s in order to meet the substantial meaningful change value and decrease risk for falls.         PT Goal 5 (Progressing)       Start:  03/27/24    Expected End:  06/25/24       6MWT 4/12/24=680 ft.  6MWT 5/10/06=086 ft.  NEW GOAL 5/10/24= Pt. Will improve 6MWT to >944 ft. To meet unlimited community Ambulator category compared to normative values.         Patient Stated Goal 1       Start:  03/27/24    Expected End:  06/25/24       Pt stated goal: \"less shaking\"                           "

## 2024-05-13 ENCOUNTER — TREATMENT (OUTPATIENT)
Dept: PHYSICAL THERAPY | Facility: CLINIC | Age: 74
End: 2024-05-13
Payer: MEDICARE

## 2024-05-13 DIAGNOSIS — R26.81 UNSTEADINESS ON FEET: Primary | ICD-10-CM

## 2024-05-13 DIAGNOSIS — G20.A1 PARKINSON'S DISEASE WITHOUT DYSKINESIA OR FLUCTUATING MANIFESTATIONS (MULTI): ICD-10-CM

## 2024-05-13 DIAGNOSIS — G20.A2 PARKINSON'S DISEASE WITHOUT DYSKINESIA, WITH FLUCTUATING MANIFESTATIONS (MULTI): ICD-10-CM

## 2024-05-13 PROCEDURE — 97110 THERAPEUTIC EXERCISES: CPT | Mod: GP,KX | Performed by: PHYSICAL THERAPIST

## 2024-05-13 NOTE — PROGRESS NOTES
Physical Therapy    Physical Therapy Treatment    Patient Name: Hakan Mccurdy  MRN: [id number]  Today's Date: 5/13/2024  Visit Number: 11  Medicare  POC end date: 6/25/24              Assessment:  Mr. Mccurdy was challenged with blaze pod exercise with multiple part sequence this date, pt. Required verbal cues for increase step length during backwards stepping however with practice the patient improved. Trailed metronome during backwards walking, however pt. Was not able to coordinate with this particular auditory cue, however pt. Responded well to counting out loud and was able to self cue for himself.  Pt. Continues to demonstrate difficulty with turns with decreased step height noted and will benefit from continued practice.    Plan: turn negotiation, backwards walking       Current Problem  Primary dx.=unsteadiness on feet  1. Unsteadiness on feet  Follow Up In Physical Therapy      2. Parkinson's disease without dyskinesia or fluctuating manifestations (Multi)  Follow Up In Physical Therapy      3. Parkinson's disease without dyskinesia, with fluctuating manifestations (Multi)  Follow Up In Physical Therapy              Subjective    Patient reports that he feels like he is walking better and getting out of chairs better.  He would like to get a reacher to  objects off of the ground.    Precautions:      Vital Signs: Not taken     Pain: N/A     Objective   Cognition: hx. Dec. STM           Pt. Arrived to visit with rick at mod-I level    Treatments:    There Ex:  -Recumbent cycle bike BLE's only L3.5, 10'x1, seat 6, to improve lower extremity strength and functional mobility endurance, VC to keep RPM's above 75    Neuro Re-Ed:  -Large amplitude stepping Forward over SBQC and foam on opposite side x10 each side with SBA,  -Large amplitude stepping lateral over SBQC with foam x10 each side with no UE support CGA with 180' turn in between each repetition  -Forward rock and reach x10 each side with PT  mirroring patient with VC for large amplitude arm movements with no UE support  -backwards functional mobility 20'x4  with first bout with metronome at 50 BPM with PT counting (1-8)  -sit<>stands with functional mobility forward, tapping blaze pods on 6 inch step plaed 10' away and walking backwards back to mat table to sit down x2 bouts with SBA with PT counting (1-8 and clapping to inc. Amplitude)   1st bout= 2 reps   2nd bout=3 reps   -obstacle course with x3 hurdles, XL airex and regular airex x4 laps with CGA, VC for BIG steps (multiple chayito catches and LOB with positive stepping strategies)      OP EDUCATION:    Sit<>stands  Large amplitude forward, side, backwards stepping, forward rocking and reach no UE support, backwards walking       Goals:  Active       PT Problem       PT Goal 1 (Met)       Start:  03/27/24    Expected End:  06/25/24    Resolved:  05/10/24    Pt. Will improve 30 second sit to stand by 2 repetitions to meet the minimally clinical important improvement.         PT Goal 2 (Progressing)       Start:  03/27/24    Expected End:  06/25/24       Pt will improve timed up and go time by 4.85 seconds to meet the meaningful detectable change in patients with Parkinson's Disease and decrease risk of falls.      Goal Note       5/10/24=13.7 seconds              PT Goal 3 (Progressing)       Start:  03/27/24    Expected End:  06/25/24       Pt will improve FGA score by 5 points to decrease his risk for falls and meet the meaningful detectable change value.         PT Goal 4 (Met)       Start:  03/27/24    Expected End:  06/25/24    Resolved:  05/10/24    Pt will improve 10MWT by .13 m/s in order to meet the substantial meaningful change value and decrease risk for falls.         PT Goal 5 (Progressing)       Start:  03/27/24    Expected End:  06/25/24       6MWT 4/12/24=680 ft.  6MWT 5/10/85=483 ft.  NEW GOAL 5/10/24= Pt. Will improve 6MWT to >944 ft. To meet unlimited community Ambulator category  "compared to normative values.         Patient Stated Goal 1       Start:  03/27/24    Expected End:  06/25/24       Pt stated goal: \"less shaking\"                           "

## 2024-05-15 ENCOUNTER — TREATMENT (OUTPATIENT)
Dept: PHYSICAL THERAPY | Facility: CLINIC | Age: 74
End: 2024-05-15
Payer: MEDICARE

## 2024-05-15 DIAGNOSIS — R26.81 UNSTEADINESS ON FEET: Primary | ICD-10-CM

## 2024-05-15 DIAGNOSIS — G20.A1 PARKINSON'S DISEASE WITHOUT DYSKINESIA OR FLUCTUATING MANIFESTATIONS (MULTI): ICD-10-CM

## 2024-05-15 DIAGNOSIS — G20.A2 PARKINSON'S DISEASE WITHOUT DYSKINESIA, WITH FLUCTUATING MANIFESTATIONS (MULTI): ICD-10-CM

## 2024-05-15 PROCEDURE — 97112 NEUROMUSCULAR REEDUCATION: CPT | Mod: GP,KX | Performed by: PHYSICAL THERAPIST

## 2024-05-15 PROCEDURE — 97110 THERAPEUTIC EXERCISES: CPT | Mod: GP,KX | Performed by: PHYSICAL THERAPIST

## 2024-05-15 NOTE — PROGRESS NOTES
Physical Therapy    Physical Therapy Treatment    Patient Name: Hakan Mccurdy  MRN: [id number]  Today's Date: 5/20/2024  Visit Number: 12  Medicare  POC end date: 6/25/24              Assessment:  Mr. Mccurdy is demonstrating good response to progressively more challenging dynamic balance challenges.  The patient had difficulty with part practice with turn negotiation on foam with multiple LOB in posterior direction with delayed compensatory stepping noted.  Initiated quadruped power exercises to focus on core strengthening, pt. Was appropriately challenged with these exercises and demonstrated safe floor to standing transfer at mod-I level with upper extremity.    Plan: turn negotiation, postural re-education with bands       Current Problem  Primary dx.=unsteadiness on feet  1. Unsteadiness on feet  Follow Up In Physical Therapy      2. Parkinson's disease without dyskinesia or fluctuating manifestations (Multi)  Follow Up In Physical Therapy      3. Parkinson's disease without dyskinesia, with fluctuating manifestations (Multi)  Follow Up In Physical Therapy              Subjective    Patient reports that he feels like he is walking better and getting out of chairs better.  He would like to get a reacher to  objects off of the ground.    Precautions:      Vital Signs: Not taken     Pain: N/A     Objective   Cognition: hx. Dec. STM           Pt. Arrived to visit with rick at mod-I level    Treatments:    There Ex:  -Recumbent cycle bike BLE's only L3.5, 10'x1, seat 6, to improve lower extremity strength and functional mobility endurance, VC to keep RPM's above 75    Neuro Re-Ed:  -Large amplitude stepping Forward over SBQC and foam on opposite side x10 each side with SBA (multiple KD's with RLE)  -Large amplitude stepping lateral over SBQC with foam x10 each side with no UE support CGA with 180' turn in between each repetition  -Forward rock and reach x10 each side with PT mirroring patient with VC for  large amplitude arm movements with no UE support  -trialled forward rock and reach with front LE on foam however pt. With multiple LOB with stepping strategies therefore d/c  -blaze pods with FA on foam, one color, color catch 1'x2 with pods on 4 inch step stepping out laterally to promote BIG step heigh to turn with CGA to min-A  -resisted forward step ups with pause at top with BTB x6 on each side with VC for inc. Ant WS and upright posture  -PWR! Quadruped Posture x10  -PWR! Quadruped twist x5  -PWR! Quadruped WS x5  -PWR! Quadruped transition part practice with foot forward/back x5  -obstacle course with x3 hurdles, XL airex with chayito at beginning,and regular airex x8 laps with CGA, VC for BIG steps (multiple chayito catches improving over time)    OP EDUCATION:    Sit<>stands  Large amplitude forward, side, backwards stepping, forward rocking and reach no UE support, backwards walking       Goals:  Active       PT Problem       PT Goal 1 (Met)       Start:  03/27/24    Expected End:  06/25/24    Resolved:  05/10/24    Pt. Will improve 30 second sit to stand by 2 repetitions to meet the minimally clinical important improvement.         PT Goal 2 (Progressing)       Start:  03/27/24    Expected End:  06/25/24       Pt will improve timed up and go time by 4.85 seconds to meet the meaningful detectable change in patients with Parkinson's Disease and decrease risk of falls.      Goal Note       5/10/24=13.7 seconds              PT Goal 3 (Progressing)       Start:  03/27/24    Expected End:  06/25/24       Pt will improve FGA score by 5 points to decrease his risk for falls and meet the meaningful detectable change value.         PT Goal 4 (Met)       Start:  03/27/24    Expected End:  06/25/24    Resolved:  05/10/24    Pt will improve 10MWT by .13 m/s in order to meet the substantial meaningful change value and decrease risk for falls.         PT Goal 5 (Progressing)       Start:  03/27/24    Expected End:   "06/25/24       6MWT 4/12/24=680 ft.  6MWT 5/10/89=240 ft.  NEW GOAL 5/10/24= Pt. Will improve 6MWT to >944 ft. To meet unlimited community Ambulator category compared to normative values.         Patient Stated Goal 1       Start:  03/27/24    Expected End:  06/25/24       Pt stated goal: \"less shaking\"                   "

## 2024-05-20 ENCOUNTER — TREATMENT (OUTPATIENT)
Dept: PHYSICAL THERAPY | Facility: CLINIC | Age: 74
End: 2024-05-20
Payer: MEDICARE

## 2024-05-20 DIAGNOSIS — G20.A1 PARKINSON'S DISEASE WITHOUT DYSKINESIA OR FLUCTUATING MANIFESTATIONS (MULTI): ICD-10-CM

## 2024-05-20 DIAGNOSIS — R26.81 UNSTEADINESS ON FEET: ICD-10-CM

## 2024-05-20 DIAGNOSIS — G20.A2 PARKINSON'S DISEASE WITHOUT DYSKINESIA, WITH FLUCTUATING MANIFESTATIONS (MULTI): ICD-10-CM

## 2024-05-20 PROCEDURE — 97112 NEUROMUSCULAR REEDUCATION: CPT | Mod: GP | Performed by: PHYSICAL THERAPIST

## 2024-05-20 PROCEDURE — 97110 THERAPEUTIC EXERCISES: CPT | Mod: GP | Performed by: PHYSICAL THERAPIST

## 2024-05-20 NOTE — PROGRESS NOTES
Physical Therapy    Physical Therapy Treatment    Patient Name: Hakan Mccurdy  MRN: [id number]  Today's Date: 5/20/2024  Visit Number: 13  Medicare  POC end date: 6/25/24              Assessment:  Mr. Mccurdy is making excellent progress with his dynamic balance and reactive balance compensatory strategies. Pt. Demonstrated improved speed of posterior compensatory stepping today.  Pt. Tends to require multiple compensatory steps in most directions when he experiences a loss of balance.  The patient was challenged with river rock negotiation, with multiple LOB however pt. With positive compensatory steps.  The patient is steadily progressing towards his long term goals.      Plan: turn negotiation, postural re-education with bands       Current Problem  Primary dx.=unsteadiness on feet  1. Parkinson's disease without dyskinesia or fluctuating manifestations (Multi)  Follow Up In Physical Therapy      2. Parkinson's disease without dyskinesia, with fluctuating manifestations (Multi)  Follow Up In Physical Therapy      3. Unsteadiness on feet  Follow Up In Physical Therapy              Subjective    Patient reports that he feels like he is walking better and getting out of chairs better.  He would like to get a reacher to  objects off of the ground.    Precautions:      Vital Signs: Not taken     Pain: N/A     Objective   Cognition: hx. Dec. STM           Pt. Arrived to visit with rick at mod-I level    Treatments:    There Ex:  -Recumbent cycle bike BLE's only L3.5, 10'x1, seat 6, to improve lower extremity strength and functional mobility endurance, VC to keep RPM's above 75  Standing rows with RTB 2x10 standing mod cues for proper technique  Standing pulldowns with RTB 2x10 standing   Provided printed handout for standing rows/pulldowns for HEP    Neuro Re-Ed:  -Large amplitude stepping Forward over SBQC and foam x10 each side with SBA (multiple KD's with RLE)  -lunges laterally onto BOSU x10 each side with  "alt UE punch onto blue mat table with SBA  -PWR! Posture with sit<>stands x10 reps with RTB x15  -Forward rock and reach x10 each side with PT mirroring patient with VC for large amplitude arm movements with no UE support  -blaze pods with FA on foam, one color, color catch 1'x2 with x3 pods on 4 inch step stepping out laterally to promote BIG step heigh to turn with CGA (positive stepping strategy today)  -resisted forward step ups on 4 inch step with pause at top with BTB x6 on each side with VC for inc. Ant WS and upright posture  -obstacle course with x3 hurdles, XL airex with, regular airex with chayito, x6 river rocks and regular airex x2 laps  -river rocks with reciprocal pattern x6 laps with 3\" pause on each step with Cga (multiple LOB with positive stepping strategies)    OP EDUCATION:    Sit<>stands  -Large amplitude forward, side, backwards stepping, forward rocking and reach no UE support, backwards walking  -Quadruped PWR!  -standing rows/pulldowns        Goals:  Active       PT Problem       PT Goal 1 (Met)       Start:  03/27/24    Expected End:  06/25/24    Resolved:  05/10/24    Pt. Will improve 30 second sit to stand by 2 repetitions to meet the minimally clinical important improvement.         PT Goal 2 (Progressing)       Start:  03/27/24    Expected End:  06/25/24       Pt will improve timed up and go time by 4.85 seconds to meet the meaningful detectable change in patients with Parkinson's Disease and decrease risk of falls.      Goal Note       5/10/24=13.7 seconds              PT Goal 3 (Progressing)       Start:  03/27/24    Expected End:  06/25/24       Pt will improve FGA score by 5 points to decrease his risk for falls and meet the meaningful detectable change value.         PT Goal 4 (Met)       Start:  03/27/24    Expected End:  06/25/24    Resolved:  05/10/24    Pt will improve 10MWT by .13 m/s in order to meet the substantial meaningful change value and decrease risk for falls.         " "PT Goal 5 (Progressing)       Start:  03/27/24    Expected End:  06/25/24       6MWT 4/12/24=680 ft.  6MWT 5/10/44=179 ft.  NEW GOAL 5/10/24= Pt. Will improve 6MWT to >944 ft. To meet unlimited community Ambulator category compared to normative values.         Patient Stated Goal 1       Start:  03/27/24    Expected End:  06/25/24       Pt stated goal: \"less shaking\"                             "

## 2024-05-22 ENCOUNTER — TREATMENT (OUTPATIENT)
Dept: PHYSICAL THERAPY | Facility: CLINIC | Age: 74
End: 2024-05-22
Payer: MEDICARE

## 2024-05-22 DIAGNOSIS — G20.A2 PARKINSON'S DISEASE WITHOUT DYSKINESIA, WITH FLUCTUATING MANIFESTATIONS (MULTI): ICD-10-CM

## 2024-05-22 DIAGNOSIS — G20.A1 PARKINSON'S DISEASE WITHOUT DYSKINESIA OR FLUCTUATING MANIFESTATIONS (MULTI): ICD-10-CM

## 2024-05-22 DIAGNOSIS — R26.81 UNSTEADINESS ON FEET: Primary | ICD-10-CM

## 2024-05-22 PROCEDURE — 97112 NEUROMUSCULAR REEDUCATION: CPT | Mod: GP | Performed by: PHYSICAL THERAPIST

## 2024-05-22 PROCEDURE — 97110 THERAPEUTIC EXERCISES: CPT | Mod: GP | Performed by: PHYSICAL THERAPIST

## 2024-05-22 NOTE — PROGRESS NOTES
Physical Therapy    Physical Therapy Treatment    Patient Name: Hakan Mccurdy  MRN: [id number]  Today's Date: 5/22/2024  Visit Number: 14  Medicare  POC end date: 6/25/24  Time Calculation  Start Time: 1016  Stop Time: 1100  Time Calculation (min): 44 min     PT Therapeutic Procedures Time Entry  Neuromuscular Re-Education Time Entry: 34  Therapeutic Exercise Time Entry: 10     Assessment:  Mr. Mccurdy demonstrated delayed balance strategies today compared to his prior visits. This type of fluctuation is to be expected with Parkinson Disease.  The patient required moderate verbal cues to maintain upright posture during obstacle course negotiation, due to pt. With tendency to lean in a forward direction which leads to festination and LOB in an anterior direction.  Mr. Mccurdy has made good progress thus far, however pt. Has started to reach plateau in progress. Discussed making next visit his last visit, plan to review HEP and perform progress check.    Plan: progress check/discharge next visit       Current Problem  Primary dx.=unsteadiness on feet  1. Unsteadiness on feet  Follow Up In Physical Therapy      2. Parkinson's disease without dyskinesia or fluctuating manifestations (Multi)  Follow Up In Physical Therapy      3. Parkinson's disease without dyskinesia, with fluctuating manifestations (Multi)  Follow Up In Physical Therapy                Subjective    Patient reports that he did his exercises at home and they went well. He has a hard time getting things off of the ground and feels like he needs a reacher.    Precautions:      Vital Signs: Not taken     Pain: N/A     Objective   Cognition: hx. Dec. STM           Pt. Arrived to visit with rick at mod-I level    Treatments:    There Ex:  -Recumbent cycle bike BLE's only L3.5, 10'x1, seat 6, to improve lower extremity strength and functional mobility endurance, VC to keep RPM's above 75    Neuro Re-Ed:  -Large amplitude stepping Forward over SBQC and foam  "x10 each side with SBA (multiple KD's with RLE)  -lunges laterally onto BOSU x10 each side with alt UE punch onto blue mat table with SBA->min-A (multiple LOB requiring min-A for one instance of LOB and pt. Demonstrated stepping strategies for other LOB.)  -Forward rock and reach x10 each side with PT mirroring patient with VC for large amplitude arm movements with no UE support  -step up/down 4 inch step with foam on top x6 each side with CGA (unilateral to no UE support)  -obstacle course with x3 hurdles, XL airex with, regular airex with chayito, x6 river rocks and regular airex x2 laps  -river rocks with reciprocal pattern x6 laps with 3\" pause on each step with Cga (multiple LOB with positive stepping strategies)    OP EDUCATION:    Sit<>stands  -Large amplitude forward, side, backwards stepping, forward rocking and reach no UE support, backwards walking  -Quadruped PWR!  -standing rows/pulldowns        Goals:  Active       PT Problem       PT Goal 1 (Met)       Start:  03/27/24    Expected End:  06/25/24    Resolved:  05/10/24    Pt. Will improve 30 second sit to stand by 2 repetitions to meet the minimally clinical important improvement.         PT Goal 2 (Progressing)       Start:  03/27/24    Expected End:  06/25/24       Pt will improve timed up and go time by 4.85 seconds to meet the meaningful detectable change in patients with Parkinson's Disease and decrease risk of falls.      Goal Note       5/10/24=13.7 seconds              PT Goal 3 (Progressing)       Start:  03/27/24    Expected End:  06/25/24       Pt will improve FGA score by 5 points to decrease his risk for falls and meet the meaningful detectable change value.         PT Goal 4 (Met)       Start:  03/27/24    Expected End:  06/25/24    Resolved:  05/10/24    Pt will improve 10MWT by .13 m/s in order to meet the substantial meaningful change value and decrease risk for falls.         PT Goal 5 (Progressing)       Start:  03/27/24    Expected " "End:  06/25/24       6MWT 4/12/24=680 ft.  6MWT 5/10/23=040 ft.  NEW GOAL 5/10/24= Pt. Will improve 6MWT to >944 ft. To meet unlimited community Ambulator category compared to normative values.         Patient Stated Goal 1       Start:  03/27/24    Expected End:  06/25/24       Pt stated goal: \"less shaking\"                               "

## 2024-05-29 ENCOUNTER — TREATMENT (OUTPATIENT)
Dept: PHYSICAL THERAPY | Facility: CLINIC | Age: 74
End: 2024-05-29
Payer: MEDICARE

## 2024-05-29 ENCOUNTER — APPOINTMENT (OUTPATIENT)
Dept: PHYSICAL THERAPY | Facility: CLINIC | Age: 74
End: 2024-05-29
Payer: MEDICARE

## 2024-05-29 ENCOUNTER — DOCUMENTATION (OUTPATIENT)
Dept: PHYSICAL THERAPY | Facility: CLINIC | Age: 74
End: 2024-05-29

## 2024-05-29 DIAGNOSIS — R26.81 UNSTEADINESS ON FEET: ICD-10-CM

## 2024-05-29 DIAGNOSIS — G20.A2 PARKINSON'S DISEASE WITHOUT DYSKINESIA, WITH FLUCTUATING MANIFESTATIONS (MULTI): ICD-10-CM

## 2024-05-29 DIAGNOSIS — R26.81 UNSTEADINESS ON FEET: Primary | ICD-10-CM

## 2024-05-29 DIAGNOSIS — G20.A1 PARKINSON'S DISEASE WITHOUT DYSKINESIA OR FLUCTUATING MANIFESTATIONS (MULTI): ICD-10-CM

## 2024-05-29 PROCEDURE — 97530 THERAPEUTIC ACTIVITIES: CPT | Mod: GP,KX | Performed by: PHYSICAL THERAPIST

## 2024-05-29 PROCEDURE — 97110 THERAPEUTIC EXERCISES: CPT | Mod: GP,KX | Performed by: PHYSICAL THERAPIST

## 2024-05-29 PROCEDURE — 97112 NEUROMUSCULAR REEDUCATION: CPT | Mod: GP,KX | Performed by: PHYSICAL THERAPIST

## 2024-05-29 NOTE — PROGRESS NOTES
"Physical Therapy    Physical Therapy Treatment    Patient Name: Hakan Mccurdy  MRN: [id number]  Today's Date: 5/29/2024  Visit Number: 15  Medicare  POC end date: 6/25/24  Time Calculation  Start Time: 1347  Stop Time: 1430  Time Calculation (min): 43 min     PT Therapeutic Procedures Time Entry  Neuromuscular Re-Education Time Entry: 16  Therapeutic Exercise Time Entry: 12  Therapeutic Activity Time Entry: 15     Assessment:  Mr. Mccurdy has made excellent progress with physical therapy interventions focused on large amplitude movements and improving balance strategies. The patient has exceeded all of his long term goals in the realms of functional strength, gait speed and dynamic balance. Mr. Mccurdy improved his dynamic balance on the FGA from a 7/30 to an 18/30 today exceeding MCID of 4 points and LTG.  The patient improved his safety and balance with turn negotiation, stairs, and backwards walking.  The patient improved his gait speeds on the 10MWT, TUG and 6MWT.  The patient improved his gait speed on the 10MWT from .42 meters/second to .90 m/s (.48 m/s improvement).  The patient also improved his gait speed on the 6MWT from 680 ft. To 1047 ft. Today.  At this time the patient has made significant progress but has started to plateau, therefore plan is to discharge pt. To home and community based exercise program at \"[facility]". Discussed plan for pt. To return with a new script for a balance \"tune-up\" in 4-6 months or if he has a significant decline in function.    Plan: progress check/discharge next visit       Current Problem  Primary dx.=unsteadiness on feet  1. Unsteadiness on feet  Follow Up In Physical Therapy      2. Parkinson's disease without dyskinesia or fluctuating manifestations (Multi)  Follow Up In Physical Therapy      3. Parkinson's disease without dyskinesia, with fluctuating manifestations (Multi)  Follow Up In Physical Therapy              Subjective    Patient reports that nothing is new. "     Precautions:      Vital Signs: Not taken     Pain: N/A     Objective   Cognition: hx. Dec. STM  TUG=12.8 and 10 sec., av=11.4 sec. (5.3 sec. Improvement)  5/10/24=13.7 seconds with no device  3/27/24= 16.7 seconds with no device     FGA:  5/29/24=18/30  5/10/24=11/30  3/27/24=7/30     30 second sit to stand=  5/10/24=10 reps with no UE support  3/27/24= 6 reps with 1 UE support on first rep, no Ue support on other 5     10MWT normal=  5/29/54=11 sec. With no device (.90 m/s)  5/10/24=16 seconds with no device (.63 m/s)  3/27/24=24.2 seconds with no device (.42 m/s)     6MWT:  5/29/24=1047 ft. With no device at mod-I level  5/10/81=445 ft. With no device with SBA  4/12/24=680 ft. With no device with SBA  Pt. Arrived to visit with rick at mod-I level           Pt. Arrived to visit with ryanlore at mod-I level    Treatments:    There Ex:  -Recumbent cycle bike BLE's only L3.5, 10'x1, seat 6, to improve lower extremity strength and functional mobility endurance, VC to keep RPM's above 75  -provided printed handout for PD specific exercise program from PD association [employer]    There Act:  Functional mobility 1047 ft. With no device at SBA  Functional mobility 20 ft. With sit<>stand, no device, timed  Functional mobility 30ft. With no device, timed    Neuro Re-Ed:  The following exercises were completed at supervision level over 20ft. distance  Gait level surface:  Change in gait speed:  Gait with horizontal head turns:  Gait with vertical head turns:  Gait with pivot turn:  Step over obstacle:  Gait with narrow JASKARAN:  Gait with eyes closed:  Ambulating backwards:  Stairs 3-6 inch stairs   Educated pt. On fall risk cut off score.     OP EDUCATION:    Sit<>stands  -Large amplitude forward, side, backwards stepping, forward rocking and reach no UE support, backwards walking  -Quadruped PWR!  -standing rows/pulldowns        Goals:  Active       PT Problem       PT Goal 1 (Met)       Start:  03/27/24    Expected  "End:  06/25/24    Resolved:  05/10/24    Pt. Will improve 30 second sit to stand by 2 repetitions to meet the minimally clinical important improvement.         PT Goal 2 (Met)       Start:  03/27/24    Expected End:  06/25/24    Resolved:  05/29/24    Pt will improve timed up and go time by 4.85 seconds to meet the meaningful detectable change in patients with Parkinson's Disease and decrease risk of falls.         PT Goal 3 (Met)       Start:  03/27/24    Expected End:  06/25/24    Resolved:  05/29/24    Pt will improve FGA score by 5 points to decrease his risk for falls and meet the meaningful detectable change value.         PT Goal 4 (Met)       Start:  03/27/24    Expected End:  06/25/24    Resolved:  05/10/24    Pt will improve 10MWT by .13 m/s in order to meet the substantial meaningful change value and decrease risk for falls.         PT Goal 5 (Met)       Start:  03/27/24    Expected End:  06/25/24    Resolved:  05/29/24    6MWT 4/12/24=680 ft.  6MWT 5/10/34=605 ft.  NEW GOAL 5/10/24= Pt. Will improve 6MWT to >944 ft. To meet unlimited community Ambulator category compared to normative values.         Patient Stated Goal 1 (Adequate for Discharge)       Start:  03/27/24    Expected End:  06/25/24       Pt stated goal: \"less shaking\"                                 " less than 2 seconds

## 2024-05-29 NOTE — PROGRESS NOTES
Physical Therapy    Discharge Summary    Name: Hakan Mccurdy  MRN: [id number]  : 1950  Date: 24    Discharge Summary: PT    Discharge Information: Date of discharge 24, Date of last visit 24, Date of evaluation 3/27/24, and Number of attended visits 15    Therapy Summary: Pt. Was treated for balance and gait deficits associated with PD.  See last visit for pt. Progress towards LTG.     Discharge Status: Pt. Was discharge to home/community based exercise program.     Rehab Discharge Reason: Achieved all and/or the most significant goals(s)

## 2024-05-29 NOTE — PROGRESS NOTES
Physical Therapy    Physical Therapy Treatment    Patient Name: Hakan Mccurdy  MRN: [id number]  Today's Date: 5/29/2024  Visit Number: 14  Medicare  POC end date: 6/25/24              Assessment:  Mr. Mccurdy demonstrated delayed balance strategies today compared to his prior visits. This type of fluctuation is to be expected with Parkinson Disease.  The patient required moderate verbal cues to maintain upright posture during obstacle course negotiation, due to pt. With tendency to lean in a forward direction which leads to festination and LOB in an anterior direction.  Mr. Mccurdy has made good progress thus far, however pt. Has started to reach plateau in progress. Discussed making next visit his last visit, plan to review HEP and perform progress check.    Plan: progress check/discharge next visit       Current Problem  Primary dx.=unsteadiness on feet  No diagnosis found.            Subjective    Patient reports that he did his exercises at home and they went well. He has a hard time getting things off of the ground and feels like he needs a reacher.    Precautions:      Vital Signs: Not taken     Pain: N/A     Objective   Cognition: hx. Dec. STM  TUG=13.7 seconds with no device  5/10/24=  3/27/24= 16.7 seconds with no device     FGA=  5/10/24=11/30  3/27/24=7/30     30 second sit to stand=  5/10/24=10 reps with no UE support  3/27/24= 6 reps with 1 UE support on first rep, no Ue support on other 5     10MWT normal=  5/10/24=16 seconds with no device (.63 m/s)  3/27/24=24.2 seconds with no device (.42 m/s)     6MWT:  5/10/04=092 ft. With no device with SBA  4/12/24=680 ft. With no device with SBA  Pt. Arrived to visit with rick at mod-I level           Pt. Arrived to visit with rick at mod-I level    Treatments:    There Ex:  -Recumbent cycle bike BLE's only L3.5, 10'x1, seat 6, to improve lower extremity strength and functional mobility endurance, VC to keep RPM's above 75    Neuro Re-Ed:  -Large  "amplitude stepping Forward over SBQC and foam x10 each side with SBA (multiple KD's with RLE)  -lunges laterally onto BOSU x10 each side with alt UE punch onto blue mat table with SBA->min-A (multiple LOB requiring min-A for one instance of LOB and pt. Demonstrated stepping strategies for other LOB.)  -Forward rock and reach x10 each side with PT mirroring patient with VC for large amplitude arm movements with no UE support  -step up/down 4 inch step with foam on top x6 each side with CGA (unilateral to no UE support)  -obstacle course with x3 hurdles, XL airex with, regular airex with chayito, x6 river rocks and regular airex x2 laps  -river rocks with reciprocal pattern x6 laps with 3\" pause on each step with Cga (multiple LOB with positive stepping strategies)    OP EDUCATION:    Sit<>stands  -Large amplitude forward, side, backwards stepping, forward rocking and reach no UE support, backwards walking  -Quadruped PWR!  -standing rows/pulldowns        Goals:                          "

## 2024-07-16 ENCOUNTER — APPOINTMENT (OUTPATIENT)
Dept: NEUROLOGY | Facility: CLINIC | Age: 74
End: 2024-07-16
Payer: MEDICARE

## 2024-07-24 ENCOUNTER — APPOINTMENT (OUTPATIENT)
Dept: NEUROLOGY | Facility: CLINIC | Age: 74
End: 2024-07-24
Payer: MEDICARE

## 2024-08-13 ENCOUNTER — APPOINTMENT (OUTPATIENT)
Dept: NEUROLOGY | Facility: CLINIC | Age: 74
End: 2024-08-13
Payer: MEDICARE

## 2024-08-27 ENCOUNTER — APPOINTMENT (OUTPATIENT)
Dept: NEUROLOGY | Facility: CLINIC | Age: 74
End: 2024-08-27
Payer: MEDICARE

## 2024-09-04 ENCOUNTER — APPOINTMENT (OUTPATIENT)
Dept: NEUROLOGY | Facility: CLINIC | Age: 74
End: 2024-09-04
Payer: MEDICARE

## 2024-09-11 ENCOUNTER — APPOINTMENT (OUTPATIENT)
Dept: NEUROLOGY | Facility: CLINIC | Age: 74
End: 2024-09-11
Payer: MEDICARE

## 2024-09-17 ENCOUNTER — APPOINTMENT (OUTPATIENT)
Dept: NEUROLOGY | Facility: CLINIC | Age: 74
End: 2024-09-17
Payer: MEDICARE

## 2024-09-27 ENCOUNTER — APPOINTMENT (OUTPATIENT)
Dept: NEUROLOGY | Facility: CLINIC | Age: 74
End: 2024-09-27
Payer: MEDICARE

## 2024-10-09 ENCOUNTER — APPOINTMENT (OUTPATIENT)
Dept: NEUROLOGY | Facility: CLINIC | Age: 74
End: 2024-10-09
Payer: MEDICARE

## 2024-10-12 DIAGNOSIS — R41.3 MEMORY LOSS: ICD-10-CM

## 2024-10-14 RX ORDER — RIVASTIGMINE TARTRATE 3 MG/1
3 CAPSULE ORAL 2 TIMES DAILY
Qty: 180 CAPSULE | Refills: 2 | Status: SHIPPED | OUTPATIENT
Start: 2024-10-14 | End: 2025-10-14